# Patient Record
Sex: MALE | Race: OTHER | NOT HISPANIC OR LATINO | URBAN - METROPOLITAN AREA
[De-identification: names, ages, dates, MRNs, and addresses within clinical notes are randomized per-mention and may not be internally consistent; named-entity substitution may affect disease eponyms.]

---

## 2020-07-04 ENCOUNTER — INPATIENT (INPATIENT)
Facility: HOSPITAL | Age: 20
LOS: 3 days | Discharge: HOME | DRG: 639 | End: 2020-07-08
Attending: INTERNAL MEDICINE | Admitting: INTERNAL MEDICINE
Payer: COMMERCIAL

## 2020-07-04 VITALS
OXYGEN SATURATION: 99 % | DIASTOLIC BLOOD PRESSURE: 91 MMHG | TEMPERATURE: 98 F | HEIGHT: 73.5 IN | SYSTOLIC BLOOD PRESSURE: 143 MMHG | RESPIRATION RATE: 20 BRPM | WEIGHT: 277.78 LBS | HEART RATE: 108 BPM

## 2020-07-04 DIAGNOSIS — E11.10 TYPE 2 DIABETES MELLITUS WITH KETOACIDOSIS WITHOUT COMA: ICD-10-CM

## 2020-07-04 LAB
ALBUMIN SERPL ELPH-MCNC: 3.6 G/DL — SIGNIFICANT CHANGE UP (ref 3.5–5)
ALBUMIN SERPL ELPH-MCNC: 4.4 G/DL — SIGNIFICANT CHANGE UP (ref 3.5–5)
ALP SERPL-CCNC: 114 U/L — SIGNIFICANT CHANGE UP (ref 40–120)
ALP SERPL-CCNC: 93 U/L — SIGNIFICANT CHANGE UP (ref 40–120)
ALT FLD-CCNC: 58 U/L DA — SIGNIFICANT CHANGE UP (ref 10–60)
ALT FLD-CCNC: 74 U/L DA — HIGH (ref 10–60)
ANION GAP SERPL CALC-SCNC: 17 MMOL/L — SIGNIFICANT CHANGE UP (ref 5–17)
ANION GAP SERPL CALC-SCNC: 20 MMOL/L — HIGH (ref 5–17)
ANISOCYTOSIS BLD QL: SLIGHT — SIGNIFICANT CHANGE UP
APPEARANCE UR: CLEAR — SIGNIFICANT CHANGE UP
AST SERPL-CCNC: 27 U/L — SIGNIFICANT CHANGE UP (ref 10–40)
AST SERPL-CCNC: 50 U/L — HIGH (ref 10–40)
BACTERIA # UR AUTO: ABNORMAL /HPF
BASE EXCESS BLDV CALC-SCNC: -16.8 MMOL/L — LOW (ref -2–2)
BASE EXCESS BLDV CALC-SCNC: -17.6 MMOL/L — LOW (ref -2–2)
BASOPHILS # BLD AUTO: 0.04 K/UL — SIGNIFICANT CHANGE UP (ref 0–0.2)
BASOPHILS NFR BLD AUTO: 0.6 % — SIGNIFICANT CHANGE UP (ref 0–2)
BILIRUB SERPL-MCNC: 0.5 MG/DL — SIGNIFICANT CHANGE UP (ref 0.2–1.2)
BILIRUB SERPL-MCNC: 0.7 MG/DL — SIGNIFICANT CHANGE UP (ref 0.2–1.2)
BILIRUB UR-MCNC: NEGATIVE — SIGNIFICANT CHANGE UP
BLOOD GAS COMMENTS, VENOUS: SIGNIFICANT CHANGE UP
BLOOD GAS COMMENTS, VENOUS: SIGNIFICANT CHANGE UP
BUN SERPL-MCNC: 10 MG/DL — SIGNIFICANT CHANGE UP (ref 7–18)
BUN SERPL-MCNC: 8 MG/DL — SIGNIFICANT CHANGE UP (ref 7–18)
CALCIUM SERPL-MCNC: 7.1 MG/DL — LOW (ref 8.4–10.5)
CALCIUM SERPL-MCNC: 8.8 MG/DL — SIGNIFICANT CHANGE UP (ref 8.4–10.5)
CHLORIDE SERPL-SCNC: 100 MMOL/L — SIGNIFICANT CHANGE UP (ref 96–108)
CHLORIDE SERPL-SCNC: 108 MMOL/L — SIGNIFICANT CHANGE UP (ref 96–108)
CO2 SERPL-SCNC: 11 MMOL/L — LOW (ref 22–31)
CO2 SERPL-SCNC: 12 MMOL/L — LOW (ref 22–31)
COLOR SPEC: YELLOW — SIGNIFICANT CHANGE UP
CREAT SERPL-MCNC: 0.92 MG/DL — SIGNIFICANT CHANGE UP (ref 0.5–1.3)
CREAT SERPL-MCNC: 1.23 MG/DL — SIGNIFICANT CHANGE UP (ref 0.5–1.3)
DIFF PNL FLD: ABNORMAL
EOSINOPHIL # BLD AUTO: 0.06 K/UL — SIGNIFICANT CHANGE UP (ref 0–0.5)
EOSINOPHIL NFR BLD AUTO: 0.8 % — SIGNIFICANT CHANGE UP (ref 0–6)
EPI CELLS # UR: SIGNIFICANT CHANGE UP /HPF
GLUCOSE SERPL-MCNC: 238 MG/DL — HIGH (ref 70–99)
GLUCOSE SERPL-MCNC: 306 MG/DL — HIGH (ref 70–99)
GLUCOSE UR QL: 1000 MG/DL
GRAN CASTS # UR COMP ASSIST: ABNORMAL /LPF
HCO3 BLDV-SCNC: 11 MMOL/L — LOW (ref 21–29)
HCO3 BLDV-SCNC: 11 MMOL/L — LOW (ref 21–29)
HCT VFR BLD CALC: 49.1 % — SIGNIFICANT CHANGE UP (ref 39–50)
HGB BLD-MCNC: 16.2 G/DL — SIGNIFICANT CHANGE UP (ref 13–17)
HOROWITZ INDEX BLDV+IHG-RTO: 21 — SIGNIFICANT CHANGE UP
HOROWITZ INDEX BLDV+IHG-RTO: 21 — SIGNIFICANT CHANGE UP
HYALINE CASTS # UR AUTO: ABNORMAL /LPF
HYPOCHROMIA BLD QL: SLIGHT — SIGNIFICANT CHANGE UP
IMM GRANULOCYTES NFR BLD AUTO: 0.6 % — SIGNIFICANT CHANGE UP (ref 0–1.5)
KETONES UR-MCNC: ABNORMAL
LEUKOCYTE ESTERASE UR-ACNC: NEGATIVE — SIGNIFICANT CHANGE UP
LG PLATELETS BLD QL AUTO: SLIGHT — SIGNIFICANT CHANGE UP
LIDOCAIN IGE QN: 291 U/L — SIGNIFICANT CHANGE UP (ref 73–393)
LYMPHOCYTES # BLD AUTO: 2.02 K/UL — SIGNIFICANT CHANGE UP (ref 1–3.3)
LYMPHOCYTES # BLD AUTO: 28.6 % — SIGNIFICANT CHANGE UP (ref 13–44)
MANUAL SMEAR VERIFICATION: SIGNIFICANT CHANGE UP
MCHC RBC-ENTMCNC: 22.7 PG — LOW (ref 27–34)
MCHC RBC-ENTMCNC: 33 GM/DL — SIGNIFICANT CHANGE UP (ref 32–36)
MCV RBC AUTO: 68.9 FL — LOW (ref 80–100)
MICROCYTES BLD QL: SLIGHT — SIGNIFICANT CHANGE UP
MONOCYTES # BLD AUTO: 0.55 K/UL — SIGNIFICANT CHANGE UP (ref 0–0.9)
MONOCYTES NFR BLD AUTO: 7.8 % — SIGNIFICANT CHANGE UP (ref 2–14)
NEUTROPHILS # BLD AUTO: 4.36 K/UL — SIGNIFICANT CHANGE UP (ref 1.8–7.4)
NEUTROPHILS NFR BLD AUTO: 61.6 % — SIGNIFICANT CHANGE UP (ref 43–77)
NITRITE UR-MCNC: NEGATIVE — SIGNIFICANT CHANGE UP
NRBC # BLD: 0 /100 WBCS — SIGNIFICANT CHANGE UP (ref 0–0)
PCO2 BLDV: 32 MMHG — LOW (ref 35–50)
PCO2 BLDV: 33 MMHG — LOW (ref 35–50)
PH BLDV: 7.14 — CRITICAL LOW (ref 7.35–7.45)
PH BLDV: 7.16 — CRITICAL LOW (ref 7.35–7.45)
PH UR: 6 — SIGNIFICANT CHANGE UP (ref 5–8)
PLAT MORPH BLD: NORMAL — SIGNIFICANT CHANGE UP
PLATELET # BLD AUTO: 237 K/UL — SIGNIFICANT CHANGE UP (ref 150–400)
PLATELET COUNT - ESTIMATE: NORMAL — SIGNIFICANT CHANGE UP
PO2 BLDV: 25 MMHG — SIGNIFICANT CHANGE UP (ref 25–45)
PO2 BLDV: 28 MMHG — SIGNIFICANT CHANGE UP (ref 25–45)
POTASSIUM SERPL-MCNC: 3.7 MMOL/L — SIGNIFICANT CHANGE UP (ref 3.5–5.3)
POTASSIUM SERPL-MCNC: 4.3 MMOL/L — SIGNIFICANT CHANGE UP (ref 3.5–5.3)
POTASSIUM SERPL-SCNC: 3.7 MMOL/L — SIGNIFICANT CHANGE UP (ref 3.5–5.3)
POTASSIUM SERPL-SCNC: 4.3 MMOL/L — SIGNIFICANT CHANGE UP (ref 3.5–5.3)
PROT SERPL-MCNC: 7.5 G/DL — SIGNIFICANT CHANGE UP (ref 6–8.3)
PROT SERPL-MCNC: 9.5 G/DL — HIGH (ref 6–8.3)
PROT UR-MCNC: 100
RBC # BLD: 7.13 M/UL — HIGH (ref 4.2–5.8)
RBC # FLD: 17.6 % — HIGH (ref 10.3–14.5)
RBC BLD AUTO: ABNORMAL
RBC CASTS # UR COMP ASSIST: ABNORMAL /HPF (ref 0–2)
SAO2 % BLDV: 41 % — LOW (ref 67–88)
SAO2 % BLDV: 50 % — LOW (ref 67–88)
SODIUM SERPL-SCNC: 131 MMOL/L — LOW (ref 135–145)
SODIUM SERPL-SCNC: 137 MMOL/L — SIGNIFICANT CHANGE UP (ref 135–145)
SP GR SPEC: 1.03 — HIGH (ref 1.01–1.02)
UROBILINOGEN FLD QL: NEGATIVE — SIGNIFICANT CHANGE UP
WBC # BLD: 7.07 K/UL — SIGNIFICANT CHANGE UP (ref 3.8–10.5)
WBC # FLD AUTO: 7.07 K/UL — SIGNIFICANT CHANGE UP (ref 3.8–10.5)
WBC UR QL: SIGNIFICANT CHANGE UP /HPF (ref 0–5)

## 2020-07-04 PROCEDURE — 99285 EMERGENCY DEPT VISIT HI MDM: CPT

## 2020-07-04 PROCEDURE — 74177 CT ABD & PELVIS W/CONTRAST: CPT | Mod: 26

## 2020-07-04 RX ORDER — SODIUM CHLORIDE 9 MG/ML
500 INJECTION INTRAMUSCULAR; INTRAVENOUS; SUBCUTANEOUS ONCE
Refills: 0 | Status: COMPLETED | OUTPATIENT
Start: 2020-07-04 | End: 2020-07-04

## 2020-07-04 RX ORDER — INSULIN LISPRO 100/ML
VIAL (ML) SUBCUTANEOUS AT BEDTIME
Refills: 0 | Status: DISCONTINUED | OUTPATIENT
Start: 2020-07-04 | End: 2020-07-05

## 2020-07-04 RX ORDER — ONDANSETRON 8 MG/1
4 TABLET, FILM COATED ORAL ONCE
Refills: 0 | Status: COMPLETED | OUTPATIENT
Start: 2020-07-04 | End: 2020-07-04

## 2020-07-04 RX ORDER — SODIUM CHLORIDE 9 MG/ML
1000 INJECTION, SOLUTION INTRAVENOUS
Refills: 0 | Status: DISCONTINUED | OUTPATIENT
Start: 2020-07-04 | End: 2020-07-05

## 2020-07-04 RX ORDER — SODIUM CHLORIDE 9 MG/ML
1500 INJECTION INTRAMUSCULAR; INTRAVENOUS; SUBCUTANEOUS ONCE
Refills: 0 | Status: COMPLETED | OUTPATIENT
Start: 2020-07-04 | End: 2020-07-04

## 2020-07-04 RX ORDER — SODIUM CHLORIDE 9 MG/ML
1000 INJECTION INTRAMUSCULAR; INTRAVENOUS; SUBCUTANEOUS ONCE
Refills: 0 | Status: COMPLETED | OUTPATIENT
Start: 2020-07-04 | End: 2020-07-04

## 2020-07-04 RX ORDER — INSULIN HUMAN 100 [IU]/ML
5 INJECTION, SOLUTION SUBCUTANEOUS ONCE
Refills: 0 | Status: COMPLETED | OUTPATIENT
Start: 2020-07-04 | End: 2020-07-04

## 2020-07-04 RX ORDER — INSULIN LISPRO 100/ML
VIAL (ML) SUBCUTANEOUS
Refills: 0 | Status: DISCONTINUED | OUTPATIENT
Start: 2020-07-04 | End: 2020-07-05

## 2020-07-04 RX ADMIN — SODIUM CHLORIDE 1000 MILLILITER(S): 9 INJECTION INTRAMUSCULAR; INTRAVENOUS; SUBCUTANEOUS at 19:10

## 2020-07-04 RX ADMIN — SODIUM CHLORIDE 500 MILLILITER(S): 9 INJECTION INTRAMUSCULAR; INTRAVENOUS; SUBCUTANEOUS at 20:10

## 2020-07-04 RX ADMIN — ONDANSETRON 4 MILLIGRAM(S): 8 TABLET, FILM COATED ORAL at 18:00

## 2020-07-04 RX ADMIN — SODIUM CHLORIDE 1000 MILLILITER(S): 9 INJECTION INTRAMUSCULAR; INTRAVENOUS; SUBCUTANEOUS at 18:00

## 2020-07-04 RX ADMIN — SODIUM CHLORIDE 500 MILLILITER(S): 9 INJECTION INTRAMUSCULAR; INTRAVENOUS; SUBCUTANEOUS at 23:54

## 2020-07-04 RX ADMIN — SODIUM CHLORIDE 1500 MILLILITER(S): 9 INJECTION INTRAMUSCULAR; INTRAVENOUS; SUBCUTANEOUS at 07:35

## 2020-07-04 RX ADMIN — INSULIN HUMAN 5 UNIT(S): 100 INJECTION, SOLUTION SUBCUTANEOUS at 22:54

## 2020-07-04 RX ADMIN — SODIUM CHLORIDE 200 MILLILITER(S): 9 INJECTION, SOLUTION INTRAVENOUS at 23:54

## 2020-07-04 NOTE — ED PROVIDER NOTE - PROGRESS NOTE DETAILS
CT shows fatty liver. Initial lab/urine workup consistent with DKA. After 3 liters BG trending down but AG still borderline and acidosis worsening at 7.14. Hemodynamically stable. Will admit for workup. Had no PMD follow up.

## 2020-07-04 NOTE — ED ADULT TRIAGE NOTE - CHIEF COMPLAINT QUOTE
Constipation x 4 days. Pt dx with Hemorrhoids. Seen at urgent care x yesterday. No relief. Also vomiting x yesterday.

## 2020-07-04 NOTE — ED ADULT NURSE NOTE - NSIMPLEMENTINTERV_GEN_ALL_ED
Implemented All Universal Safety Interventions:  [place] to call system. Call bell, personal items and telephone within reach. Instruct patient to call for assistance. Room bathroom lighting operational. Non-slip footwear when patient is off stretcher. Physically safe environment: no spills, clutter or unnecessary equipment. Stretcher in lowest position, wheels locked, appropriate side rails in place.

## 2020-07-04 NOTE — ED PROVIDER NOTE - OBJECTIVE STATEMENT
20 year-old male, history of overweight, no surgical history, presents with cc constipation x 3 days. Gradual onset of inability to have a BM at all for 3 days which is unusual for him. Still able to pass gas. Since yesterday started having R sided lower abdominal discomfort. Also reports nausea and 1-2 episodes of NBNB vomiting after eating every day for 3 days. Denies any fever, chills, urinary symptoms, testicular pain, flank pain or any other complaints. Was seen at urgent care and told that he had hemorrhoids and fissure and was given cream.

## 2020-07-04 NOTE — ED PROVIDER NOTE - ATTENDING CONTRIBUTION TO CARE
19 yo M with abdominal pain and constipation X 3 days. Nonfocal exam. Labs remarkable for mild DKA. AG 20. VBG 7.16. Mildly improved with IVFs. Insulin given. BG improved, but patient still acidotics. Will admit for further diabetic management. patient will new onset diabetes and will need endo/PMD f/u. Agreeable for admission. Endorsed to JONATHAN Curtis.     Dr. MARIO ALBERTO Arauz:  I have independently evaluated the patient and have documented in the appropriate sections above.  I agree with the exam and plan as noted above.

## 2020-07-05 DIAGNOSIS — E03.9 HYPOTHYROIDISM, UNSPECIFIED: ICD-10-CM

## 2020-07-05 DIAGNOSIS — E11.10 TYPE 2 DIABETES MELLITUS WITH KETOACIDOSIS WITHOUT COMA: ICD-10-CM

## 2020-07-05 DIAGNOSIS — E66.9 OBESITY, UNSPECIFIED: ICD-10-CM

## 2020-07-05 DIAGNOSIS — K59.00 CONSTIPATION, UNSPECIFIED: ICD-10-CM

## 2020-07-05 DIAGNOSIS — E11.9 TYPE 2 DIABETES MELLITUS WITHOUT COMPLICATIONS: ICD-10-CM

## 2020-07-05 LAB
A1C WITH ESTIMATED AVERAGE GLUCOSE RESULT: 11.4 % — HIGH (ref 4–5.6)
A1C WITH ESTIMATED AVERAGE GLUCOSE RESULT: 11.5 % — HIGH (ref 4–5.6)
ALBUMIN SERPL ELPH-MCNC: 3.2 G/DL — LOW (ref 3.5–5)
ALBUMIN SERPL ELPH-MCNC: 3.4 G/DL — LOW (ref 3.5–5)
ALBUMIN SERPL ELPH-MCNC: 3.6 G/DL — SIGNIFICANT CHANGE UP (ref 3.5–5)
ALP SERPL-CCNC: 78 U/L — SIGNIFICANT CHANGE UP (ref 40–120)
ALP SERPL-CCNC: 80 U/L — SIGNIFICANT CHANGE UP (ref 40–120)
ALP SERPL-CCNC: 90 U/L — SIGNIFICANT CHANGE UP (ref 40–120)
ALT FLD-CCNC: 54 U/L DA — SIGNIFICANT CHANGE UP (ref 10–60)
ALT FLD-CCNC: 55 U/L DA — SIGNIFICANT CHANGE UP (ref 10–60)
ALT FLD-CCNC: 56 U/L DA — SIGNIFICANT CHANGE UP (ref 10–60)
ANION GAP SERPL CALC-SCNC: 14 MMOL/L — SIGNIFICANT CHANGE UP (ref 5–17)
ANION GAP SERPL CALC-SCNC: 14 MMOL/L — SIGNIFICANT CHANGE UP (ref 5–17)
ANION GAP SERPL CALC-SCNC: 15 MMOL/L — SIGNIFICANT CHANGE UP (ref 5–17)
ANION GAP SERPL CALC-SCNC: 15 MMOL/L — SIGNIFICANT CHANGE UP (ref 5–17)
ANION GAP SERPL CALC-SCNC: 16 MMOL/L — SIGNIFICANT CHANGE UP (ref 5–17)
ANION GAP SERPL CALC-SCNC: 17 MMOL/L — SIGNIFICANT CHANGE UP (ref 5–17)
AST SERPL-CCNC: 21 U/L — SIGNIFICANT CHANGE UP (ref 10–40)
AST SERPL-CCNC: 24 U/L — SIGNIFICANT CHANGE UP (ref 10–40)
AST SERPL-CCNC: 30 U/L — SIGNIFICANT CHANGE UP (ref 10–40)
BASE EXCESS BLDA CALC-SCNC: -17.8 MMOL/L — LOW (ref -2–2)
BASE EXCESS BLDV CALC-SCNC: -16.2 MMOL/L — LOW (ref -2–2)
BASE EXCESS BLDV CALC-SCNC: -16.5 MMOL/L — LOW (ref -2–2)
BASOPHILS # BLD AUTO: 0.04 K/UL — SIGNIFICANT CHANGE UP (ref 0–0.2)
BASOPHILS NFR BLD AUTO: 0.5 % — SIGNIFICANT CHANGE UP (ref 0–2)
BILIRUB SERPL-MCNC: 0.5 MG/DL — SIGNIFICANT CHANGE UP (ref 0.2–1.2)
BILIRUB SERPL-MCNC: 0.5 MG/DL — SIGNIFICANT CHANGE UP (ref 0.2–1.2)
BILIRUB SERPL-MCNC: 0.6 MG/DL — SIGNIFICANT CHANGE UP (ref 0.2–1.2)
BLOOD GAS COMMENTS ARTERIAL: SIGNIFICANT CHANGE UP
BLOOD GAS COMMENTS, VENOUS: SIGNIFICANT CHANGE UP
BUN SERPL-MCNC: 4 MG/DL — LOW (ref 7–18)
BUN SERPL-MCNC: 5 MG/DL — LOW (ref 7–18)
BUN SERPL-MCNC: 6 MG/DL — LOW (ref 7–18)
BUN SERPL-MCNC: 6 MG/DL — LOW (ref 7–18)
BUN SERPL-MCNC: 7 MG/DL — SIGNIFICANT CHANGE UP (ref 7–18)
BUN SERPL-MCNC: 8 MG/DL — SIGNIFICANT CHANGE UP (ref 7–18)
CALCIUM SERPL-MCNC: 7.6 MG/DL — LOW (ref 8.4–10.5)
CALCIUM SERPL-MCNC: 7.6 MG/DL — LOW (ref 8.4–10.5)
CALCIUM SERPL-MCNC: 7.7 MG/DL — LOW (ref 8.4–10.5)
CALCIUM SERPL-MCNC: 7.7 MG/DL — LOW (ref 8.4–10.5)
CALCIUM SERPL-MCNC: 7.8 MG/DL — LOW (ref 8.4–10.5)
CALCIUM SERPL-MCNC: 7.9 MG/DL — LOW (ref 8.4–10.5)
CHLORIDE SERPL-SCNC: 108 MMOL/L — SIGNIFICANT CHANGE UP (ref 96–108)
CHLORIDE SERPL-SCNC: 108 MMOL/L — SIGNIFICANT CHANGE UP (ref 96–108)
CHLORIDE SERPL-SCNC: 111 MMOL/L — HIGH (ref 96–108)
CHLORIDE SERPL-SCNC: 111 MMOL/L — HIGH (ref 96–108)
CHLORIDE SERPL-SCNC: 112 MMOL/L — HIGH (ref 96–108)
CHLORIDE SERPL-SCNC: 113 MMOL/L — HIGH (ref 96–108)
CHOLEST SERPL-MCNC: 217 MG/DL — HIGH (ref 10–199)
CO2 SERPL-SCNC: 11 MMOL/L — LOW (ref 22–31)
CO2 SERPL-SCNC: 12 MMOL/L — LOW (ref 22–31)
CO2 SERPL-SCNC: 13 MMOL/L — LOW (ref 22–31)
CO2 SERPL-SCNC: 14 MMOL/L — LOW (ref 22–31)
CREAT SERPL-MCNC: 0.86 MG/DL — SIGNIFICANT CHANGE UP (ref 0.5–1.3)
CREAT SERPL-MCNC: 0.9 MG/DL — SIGNIFICANT CHANGE UP (ref 0.5–1.3)
CREAT SERPL-MCNC: 0.92 MG/DL — SIGNIFICANT CHANGE UP (ref 0.5–1.3)
CREAT SERPL-MCNC: 0.92 MG/DL — SIGNIFICANT CHANGE UP (ref 0.5–1.3)
CREAT SERPL-MCNC: 0.96 MG/DL — SIGNIFICANT CHANGE UP (ref 0.5–1.3)
CREAT SERPL-MCNC: 0.97 MG/DL — SIGNIFICANT CHANGE UP (ref 0.5–1.3)
EOSINOPHIL # BLD AUTO: 0.06 K/UL — SIGNIFICANT CHANGE UP (ref 0–0.5)
EOSINOPHIL NFR BLD AUTO: 0.8 % — SIGNIFICANT CHANGE UP (ref 0–6)
ESTIMATED AVERAGE GLUCOSE: 280 MG/DL — HIGH (ref 68–114)
ESTIMATED AVERAGE GLUCOSE: 283 MG/DL — HIGH (ref 68–114)
FOLATE SERPL-MCNC: >20 NG/ML — SIGNIFICANT CHANGE UP
GLUCOSE BLDC GLUCOMTR-MCNC: 133 MG/DL — HIGH (ref 70–99)
GLUCOSE BLDC GLUCOMTR-MCNC: 150 MG/DL — HIGH (ref 70–99)
GLUCOSE BLDC GLUCOMTR-MCNC: 171 MG/DL — HIGH (ref 70–99)
GLUCOSE BLDC GLUCOMTR-MCNC: 174 MG/DL — HIGH (ref 70–99)
GLUCOSE BLDC GLUCOMTR-MCNC: 183 MG/DL — HIGH (ref 70–99)
GLUCOSE BLDC GLUCOMTR-MCNC: 208 MG/DL — HIGH (ref 70–99)
GLUCOSE BLDC GLUCOMTR-MCNC: 222 MG/DL — HIGH (ref 70–99)
GLUCOSE BLDC GLUCOMTR-MCNC: 226 MG/DL — HIGH (ref 70–99)
GLUCOSE BLDC GLUCOMTR-MCNC: 226 MG/DL — HIGH (ref 70–99)
GLUCOSE BLDC GLUCOMTR-MCNC: 237 MG/DL — HIGH (ref 70–99)
GLUCOSE BLDC GLUCOMTR-MCNC: 257 MG/DL — HIGH (ref 70–99)
GLUCOSE BLDC GLUCOMTR-MCNC: 267 MG/DL — HIGH (ref 70–99)
GLUCOSE BLDC GLUCOMTR-MCNC: 288 MG/DL — HIGH (ref 70–99)
GLUCOSE SERPL-MCNC: 157 MG/DL — HIGH (ref 70–99)
GLUCOSE SERPL-MCNC: 200 MG/DL — HIGH (ref 70–99)
GLUCOSE SERPL-MCNC: 205 MG/DL — HIGH (ref 70–99)
GLUCOSE SERPL-MCNC: 222 MG/DL — HIGH (ref 70–99)
GLUCOSE SERPL-MCNC: 234 MG/DL — HIGH (ref 70–99)
GLUCOSE SERPL-MCNC: 250 MG/DL — HIGH (ref 70–99)
HCO3 BLDA-SCNC: 8 MMOL/L — LOW (ref 23–27)
HCO3 BLDV-SCNC: 10 MMOL/L — LOW (ref 21–29)
HCO3 BLDV-SCNC: 10 MMOL/L — LOW (ref 21–29)
HCT VFR BLD CALC: 43.1 % — SIGNIFICANT CHANGE UP (ref 39–50)
HCT VFR BLD CALC: 45.6 % — SIGNIFICANT CHANGE UP (ref 39–50)
HDLC SERPL-MCNC: 34 MG/DL — LOW
HGB BLD-MCNC: 13.8 G/DL — SIGNIFICANT CHANGE UP (ref 13–17)
HGB BLD-MCNC: 14.7 G/DL — SIGNIFICANT CHANGE UP (ref 13–17)
HOROWITZ INDEX BLDA+IHG-RTO: 21 — SIGNIFICANT CHANGE UP
HOROWITZ INDEX BLDV+IHG-RTO: 21 — SIGNIFICANT CHANGE UP
HOROWITZ INDEX BLDV+IHG-RTO: 21 — SIGNIFICANT CHANGE UP
IMM GRANULOCYTES NFR BLD AUTO: 0.8 % — SIGNIFICANT CHANGE UP (ref 0–1.5)
LACTATE SERPL-SCNC: 0.8 MMOL/L — SIGNIFICANT CHANGE UP (ref 0.7–2)
LIPID PNL WITH DIRECT LDL SERPL: 137 MG/DL — SIGNIFICANT CHANGE UP
LYMPHOCYTES # BLD AUTO: 1.72 K/UL — SIGNIFICANT CHANGE UP (ref 1–3.3)
LYMPHOCYTES # BLD AUTO: 22.3 % — SIGNIFICANT CHANGE UP (ref 13–44)
MAGNESIUM SERPL-MCNC: 2 MG/DL — SIGNIFICANT CHANGE UP (ref 1.6–2.6)
MAGNESIUM SERPL-MCNC: 2 MG/DL — SIGNIFICANT CHANGE UP (ref 1.6–2.6)
MAGNESIUM SERPL-MCNC: 2.1 MG/DL — SIGNIFICANT CHANGE UP (ref 1.6–2.6)
MAGNESIUM SERPL-MCNC: 2.1 MG/DL — SIGNIFICANT CHANGE UP (ref 1.6–2.6)
MCHC RBC-ENTMCNC: 22.3 PG — LOW (ref 27–34)
MCHC RBC-ENTMCNC: 22.6 PG — LOW (ref 27–34)
MCHC RBC-ENTMCNC: 32 GM/DL — SIGNIFICANT CHANGE UP (ref 32–36)
MCHC RBC-ENTMCNC: 32.2 GM/DL — SIGNIFICANT CHANGE UP (ref 32–36)
MCV RBC AUTO: 69.3 FL — LOW (ref 80–100)
MCV RBC AUTO: 70.5 FL — LOW (ref 80–100)
MONOCYTES # BLD AUTO: 0.65 K/UL — SIGNIFICANT CHANGE UP (ref 0–0.9)
MONOCYTES NFR BLD AUTO: 8.4 % — SIGNIFICANT CHANGE UP (ref 2–14)
NEUTROPHILS # BLD AUTO: 5.18 K/UL — SIGNIFICANT CHANGE UP (ref 1.8–7.4)
NEUTROPHILS NFR BLD AUTO: 67.2 % — SIGNIFICANT CHANGE UP (ref 43–77)
NRBC # BLD: 0 /100 WBCS — SIGNIFICANT CHANGE UP (ref 0–0)
NRBC # BLD: 0 /100 WBCS — SIGNIFICANT CHANGE UP (ref 0–0)
OSMOLALITY SERPL: 297 MOSMOL/KG — SIGNIFICANT CHANGE UP (ref 275–300)
PCO2 BLDA: 22 MMHG — LOW (ref 32–46)
PCO2 BLDV: 26 MMHG — LOW (ref 35–50)
PCO2 BLDV: 28 MMHG — LOW (ref 35–50)
PCP SPEC-MCNC: SIGNIFICANT CHANGE UP
PH BLDA: 7.22 — LOW (ref 7.35–7.45)
PH BLDV: 7.19 — CRITICAL LOW (ref 7.35–7.45)
PH BLDV: 7.22 — LOW (ref 7.35–7.45)
PHOSPHATE SERPL-MCNC: 0.9 MG/DL — CRITICAL LOW (ref 2.5–4.5)
PHOSPHATE SERPL-MCNC: 1.1 MG/DL — LOW (ref 2.5–4.5)
PHOSPHATE SERPL-MCNC: 1.4 MG/DL — LOW (ref 2.5–4.5)
PHOSPHATE SERPL-MCNC: 1.5 MG/DL — LOW (ref 2.5–4.5)
PLATELET # BLD AUTO: 220 K/UL — SIGNIFICANT CHANGE UP (ref 150–400)
PLATELET # BLD AUTO: 227 K/UL — SIGNIFICANT CHANGE UP (ref 150–400)
PO2 BLDA: 122 MMHG — HIGH (ref 74–108)
PO2 BLDV: 30 MMHG — SIGNIFICANT CHANGE UP (ref 25–45)
PO2 BLDV: 38 MMHG — SIGNIFICANT CHANGE UP (ref 25–45)
POTASSIUM SERPL-MCNC: 3.1 MMOL/L — LOW (ref 3.5–5.3)
POTASSIUM SERPL-MCNC: 3.5 MMOL/L — SIGNIFICANT CHANGE UP (ref 3.5–5.3)
POTASSIUM SERPL-MCNC: 3.5 MMOL/L — SIGNIFICANT CHANGE UP (ref 3.5–5.3)
POTASSIUM SERPL-MCNC: 3.6 MMOL/L — SIGNIFICANT CHANGE UP (ref 3.5–5.3)
POTASSIUM SERPL-MCNC: 3.7 MMOL/L — SIGNIFICANT CHANGE UP (ref 3.5–5.3)
POTASSIUM SERPL-MCNC: 3.7 MMOL/L — SIGNIFICANT CHANGE UP (ref 3.5–5.3)
POTASSIUM SERPL-SCNC: 3.1 MMOL/L — LOW (ref 3.5–5.3)
POTASSIUM SERPL-SCNC: 3.5 MMOL/L — SIGNIFICANT CHANGE UP (ref 3.5–5.3)
POTASSIUM SERPL-SCNC: 3.5 MMOL/L — SIGNIFICANT CHANGE UP (ref 3.5–5.3)
POTASSIUM SERPL-SCNC: 3.6 MMOL/L — SIGNIFICANT CHANGE UP (ref 3.5–5.3)
POTASSIUM SERPL-SCNC: 3.7 MMOL/L — SIGNIFICANT CHANGE UP (ref 3.5–5.3)
POTASSIUM SERPL-SCNC: 3.7 MMOL/L — SIGNIFICANT CHANGE UP (ref 3.5–5.3)
PROT SERPL-MCNC: 6.8 G/DL — SIGNIFICANT CHANGE UP (ref 6–8.3)
PROT SERPL-MCNC: 7 G/DL — SIGNIFICANT CHANGE UP (ref 6–8.3)
PROT SERPL-MCNC: 7.7 G/DL — SIGNIFICANT CHANGE UP (ref 6–8.3)
RBC # BLD: 6.11 M/UL — HIGH (ref 4.2–5.8)
RBC # BLD: 6.58 M/UL — HIGH (ref 4.2–5.8)
RBC # FLD: 16 % — HIGH (ref 10.3–14.5)
RBC # FLD: 17.2 % — HIGH (ref 10.3–14.5)
SAO2 % BLDA: 98 % — HIGH (ref 92–96)
SAO2 % BLDV: 65 % — LOW (ref 67–88)
SAO2 % BLDV: 78 % — SIGNIFICANT CHANGE UP (ref 67–88)
SARS-COV-2 IGG SERPL QL IA: POSITIVE
SARS-COV-2 IGM SERPL IA-ACNC: 89.4 INDEX — HIGH
SARS-COV-2 RNA SPEC QL NAA+PROBE: SIGNIFICANT CHANGE UP
SARS-COV-2 RNA SPEC QL NAA+PROBE: SIGNIFICANT CHANGE UP
SODIUM SERPL-SCNC: 135 MMOL/L — SIGNIFICANT CHANGE UP (ref 135–145)
SODIUM SERPL-SCNC: 137 MMOL/L — SIGNIFICANT CHANGE UP (ref 135–145)
SODIUM SERPL-SCNC: 137 MMOL/L — SIGNIFICANT CHANGE UP (ref 135–145)
SODIUM SERPL-SCNC: 138 MMOL/L — SIGNIFICANT CHANGE UP (ref 135–145)
SODIUM SERPL-SCNC: 139 MMOL/L — SIGNIFICANT CHANGE UP (ref 135–145)
SODIUM SERPL-SCNC: 140 MMOL/L — SIGNIFICANT CHANGE UP (ref 135–145)
T3FREE SERPL-MCNC: 1.56 PG/ML — LOW (ref 1.8–4.6)
T4 FREE SERPL-MCNC: 0.9 NG/DL — SIGNIFICANT CHANGE UP (ref 0.9–1.8)
TOTAL CHOLESTEROL/HDL RATIO MEASUREMENT: 6.4 RATIO — SIGNIFICANT CHANGE UP (ref 3.4–9.6)
TRIGL SERPL-MCNC: 229 MG/DL — HIGH (ref 10–149)
TSH SERPL-MCNC: 8.48 UU/ML — HIGH (ref 0.34–4.82)
TSH SERPL-MCNC: 8.58 UU/ML — HIGH (ref 0.34–4.82)
VIT B12 SERPL-MCNC: 900 PG/ML — SIGNIFICANT CHANGE UP (ref 232–1245)
WBC # BLD: 7.71 K/UL — SIGNIFICANT CHANGE UP (ref 3.8–10.5)
WBC # BLD: 8.27 K/UL — SIGNIFICANT CHANGE UP (ref 3.8–10.5)
WBC # FLD AUTO: 7.71 K/UL — SIGNIFICANT CHANGE UP (ref 3.8–10.5)
WBC # FLD AUTO: 8.27 K/UL — SIGNIFICANT CHANGE UP (ref 3.8–10.5)

## 2020-07-05 RX ORDER — INSULIN HUMAN 100 [IU]/ML
1 INJECTION, SOLUTION SUBCUTANEOUS
Qty: 100 | Refills: 0 | Status: DISCONTINUED | OUTPATIENT
Start: 2020-07-05 | End: 2020-07-05

## 2020-07-05 RX ORDER — POLYETHYLENE GLYCOL 3350 17 G/17G
17 POWDER, FOR SOLUTION ORAL ONCE
Refills: 0 | Status: COMPLETED | OUTPATIENT
Start: 2020-07-05 | End: 2020-07-05

## 2020-07-05 RX ORDER — ATORVASTATIN CALCIUM 80 MG/1
40 TABLET, FILM COATED ORAL AT BEDTIME
Refills: 0 | Status: DISCONTINUED | OUTPATIENT
Start: 2020-07-05 | End: 2020-07-08

## 2020-07-05 RX ORDER — INSULIN LISPRO 100/ML
VIAL (ML) SUBCUTANEOUS EVERY 4 HOURS
Refills: 0 | Status: DISCONTINUED | OUTPATIENT
Start: 2020-07-05 | End: 2020-07-05

## 2020-07-05 RX ORDER — LEVOTHYROXINE SODIUM 125 MCG
25 TABLET ORAL DAILY
Refills: 0 | Status: DISCONTINUED | OUTPATIENT
Start: 2020-07-05 | End: 2020-07-06

## 2020-07-05 RX ORDER — POTASSIUM CHLORIDE 20 MEQ
40 PACKET (EA) ORAL ONCE
Refills: 0 | Status: COMPLETED | OUTPATIENT
Start: 2020-07-05 | End: 2020-07-05

## 2020-07-05 RX ORDER — SODIUM CHLORIDE 9 MG/ML
1000 INJECTION, SOLUTION INTRAVENOUS
Refills: 0 | Status: DISCONTINUED | OUTPATIENT
Start: 2020-07-05 | End: 2020-07-05

## 2020-07-05 RX ORDER — INSULIN HUMAN 100 [IU]/ML
2 INJECTION, SOLUTION SUBCUTANEOUS
Qty: 100 | Refills: 0 | Status: DISCONTINUED | OUTPATIENT
Start: 2020-07-05 | End: 2020-07-06

## 2020-07-05 RX ORDER — ENOXAPARIN SODIUM 100 MG/ML
40 INJECTION SUBCUTANEOUS DAILY
Refills: 0 | Status: DISCONTINUED | OUTPATIENT
Start: 2020-07-05 | End: 2020-07-08

## 2020-07-05 RX ORDER — INSULIN LISPRO 100/ML
VIAL (ML) SUBCUTANEOUS EVERY 6 HOURS
Refills: 0 | Status: DISCONTINUED | OUTPATIENT
Start: 2020-07-05 | End: 2020-07-05

## 2020-07-05 RX ORDER — PANTOPRAZOLE SODIUM 20 MG/1
40 TABLET, DELAYED RELEASE ORAL DAILY
Refills: 0 | Status: DISCONTINUED | OUTPATIENT
Start: 2020-07-05 | End: 2020-07-08

## 2020-07-05 RX ORDER — POTASSIUM PHOSPHATE, MONOBASIC POTASSIUM PHOSPHATE, DIBASIC 236; 224 MG/ML; MG/ML
30 INJECTION, SOLUTION INTRAVENOUS ONCE
Refills: 0 | Status: COMPLETED | OUTPATIENT
Start: 2020-07-05 | End: 2020-07-05

## 2020-07-05 RX ORDER — DEXTROSE MONOHYDRATE, SODIUM CHLORIDE, AND POTASSIUM CHLORIDE 50; .745; 4.5 G/1000ML; G/1000ML; G/1000ML
1000 INJECTION, SOLUTION INTRAVENOUS
Refills: 0 | Status: DISCONTINUED | OUTPATIENT
Start: 2020-07-05 | End: 2020-07-06

## 2020-07-05 RX ORDER — SODIUM CHLORIDE 9 MG/ML
1000 INJECTION INTRAMUSCULAR; INTRAVENOUS; SUBCUTANEOUS ONCE
Refills: 0 | Status: COMPLETED | OUTPATIENT
Start: 2020-07-05 | End: 2020-07-05

## 2020-07-05 RX ORDER — INSULIN HUMAN 100 [IU]/ML
12 INJECTION, SOLUTION SUBCUTANEOUS
Qty: 100 | Refills: 0 | Status: DISCONTINUED | OUTPATIENT
Start: 2020-07-05 | End: 2020-07-05

## 2020-07-05 RX ORDER — POTASSIUM PHOSPHATE, MONOBASIC POTASSIUM PHOSPHATE, DIBASIC 236; 224 MG/ML; MG/ML
15 INJECTION, SOLUTION INTRAVENOUS ONCE
Refills: 0 | Status: COMPLETED | OUTPATIENT
Start: 2020-07-05 | End: 2020-07-05

## 2020-07-05 RX ORDER — POTASSIUM PHOSPHATE, MONOBASIC POTASSIUM PHOSPHATE, DIBASIC 236; 224 MG/ML; MG/ML
15 INJECTION, SOLUTION INTRAVENOUS ONCE
Refills: 0 | Status: DISCONTINUED | OUTPATIENT
Start: 2020-07-05 | End: 2020-07-05

## 2020-07-05 RX ADMIN — POTASSIUM PHOSPHATE, MONOBASIC POTASSIUM PHOSPHATE, DIBASIC 83.33 MILLIMOLE(S): 236; 224 INJECTION, SOLUTION INTRAVENOUS at 16:08

## 2020-07-05 RX ADMIN — POTASSIUM PHOSPHATE, MONOBASIC POTASSIUM PHOSPHATE, DIBASIC 62.5 MILLIMOLE(S): 236; 224 INJECTION, SOLUTION INTRAVENOUS at 09:37

## 2020-07-05 RX ADMIN — Medication 40 MILLIEQUIVALENT(S): at 15:36

## 2020-07-05 RX ADMIN — DEXTROSE MONOHYDRATE, SODIUM CHLORIDE, AND POTASSIUM CHLORIDE 200 MILLILITER(S): 50; .745; 4.5 INJECTION, SOLUTION INTRAVENOUS at 18:39

## 2020-07-05 RX ADMIN — SODIUM CHLORIDE 200 MILLILITER(S): 9 INJECTION, SOLUTION INTRAVENOUS at 05:44

## 2020-07-05 RX ADMIN — DEXTROSE MONOHYDRATE, SODIUM CHLORIDE, AND POTASSIUM CHLORIDE 200 MILLILITER(S): 50; .745; 4.5 INJECTION, SOLUTION INTRAVENOUS at 16:30

## 2020-07-05 RX ADMIN — INSULIN HUMAN 2 UNIT(S)/HR: 100 INJECTION, SOLUTION SUBCUTANEOUS at 20:08

## 2020-07-05 RX ADMIN — PANTOPRAZOLE SODIUM 40 MILLIGRAM(S): 20 TABLET, DELAYED RELEASE ORAL at 13:08

## 2020-07-05 RX ADMIN — ENOXAPARIN SODIUM 40 MILLIGRAM(S): 100 INJECTION SUBCUTANEOUS at 13:57

## 2020-07-05 RX ADMIN — Medication 25 MICROGRAM(S): at 13:52

## 2020-07-05 RX ADMIN — SODIUM CHLORIDE 150 MILLILITER(S): 9 INJECTION, SOLUTION INTRAVENOUS at 12:49

## 2020-07-05 RX ADMIN — INSULIN HUMAN 12 UNIT(S)/HR: 100 INJECTION, SOLUTION SUBCUTANEOUS at 12:04

## 2020-07-05 RX ADMIN — ATORVASTATIN CALCIUM 40 MILLIGRAM(S): 80 TABLET, FILM COATED ORAL at 21:01

## 2020-07-05 RX ADMIN — POTASSIUM PHOSPHATE, MONOBASIC POTASSIUM PHOSPHATE, DIBASIC 83.33 MILLIMOLE(S): 236; 224 INJECTION, SOLUTION INTRAVENOUS at 21:01

## 2020-07-05 RX ADMIN — SODIUM CHLORIDE 1000 MILLILITER(S): 9 INJECTION INTRAMUSCULAR; INTRAVENOUS; SUBCUTANEOUS at 09:38

## 2020-07-05 RX ADMIN — Medication 4: at 05:43

## 2020-07-05 RX ADMIN — POLYETHYLENE GLYCOL 3350 17 GRAM(S): 17 POWDER, FOR SOLUTION ORAL at 02:54

## 2020-07-05 NOTE — CONSULT NOTE ADULT - ASSESSMENT
20 year-old  overweight male, with medical history of hypothyroidism not on medication, presents with cc constipation x 4 days and vomiting for past 2 days, NBNB.  Patient was admitted to medicine initially but after enough fluid resuscitation acidosis did not resolve. Patient is being transferred to ICU for insulin drip and more close monitoring of BMP.    Diagnosis  1. DKA  2. Metabolic acidosis  3.Undiagnosed diabetes  4. Hypothyroidism    Plan:     Neuro:   AOx3  No issues  Not on any sedatives    Cardio:  No known cardiac history  EKG- NSR  Not in shock  s/p 4.5 L fluid resuscitation  c/w IVF    Respiratory:  No active issues, saturating well on room air    Infectious:  Normal WBC  No signs of infection    Nephro:  Metabolic acidosis, Anion gap closed  bicarb-11, sr cr-wnl  Potassium-3.6  Phosporous-1.1, replaced  c/w d5+half NS@150cc/hr  BMP q4. monitor electrolytes    Endocrine:  -New onset diabetes which was undiagnosed.  comes in with DKA, anion gap resolved after 4L of fluid resuscitation  patient is still acidotic with Low bicarb-11  ABG showed pH of 7.22  Started on insulin dripn, finger stick q1, BMP q4  Fluids-D5+half NS with potassium supplement@150cc/hr  NPO for now  Monitor pH  [id number]-86.8  Endo consult Dr MALOU Curtis    -Hypothyroidism:  has h/o hypothyroidism, not on treatment   TSh of 8.9  f/u T3, T4    GI:  No active issue 20 year-old  overweight male, with medical history of hypothyroidism not on medication, presents with cc constipation x 4 days and vomiting for past 2 days, NBNB.  Patient was admitted to medicine initially but after enough fluid resuscitation acidosis did not resolve. Patient is being transferred to ICU for insulin drip and more close monitoring of BMP.    Diagnosis  1. DKA  2. Metabolic acidosis  3.Undiagnosed diabetes  4. Hypothyroidism    Plan:     Neuro:   AOx3  No issues  Not on any sedatives    Cardio:  No known cardiac history  EKG- NSR  Not in shock  s/p 4.5 L fluid resuscitation  c/w IVF    Respiratory:  No active issues, saturating well on room air    Infectious:  Normal WBC  No signs of infection    Nephro:  Metabolic acidosis, Anion gap closed  bicarb-11, sr cr-wnl  Potassium-3.6  Phosporous-1.1, replaced  c/w d5+half NS@150cc/hr  BMP q4. monitor electrolytes    Endocrine:  -New onset diabetes which was undiagnosed.  comes in with DKA, anion gap resolved after 4L of fluid resuscitation  patient is still acidotic with Low bicarb-11  ABG showed pH of 7.22  Started on insulin dripn, finger stick q1, BMP q4  Fluids-D5+half NS with potassium supplement@150cc/hr  NPO for now  Monitor pH  [id number]-62.8  Endo consult Dr MALOU Curtis    -Hypothyroidism:  has h/o hypothyroidism, not on treatment   TSh of 8.9  f/u T3, T4    GI:  No active issue    Prophylaxis:  On lovenox sub q  protonix IV

## 2020-07-05 NOTE — H&P ADULT - NSHPPHYSICALEXAM_GEN_ALL_CORE
· CONSTITUTIONAL: Obese appearing, awake, alert, oriented to person, place, time/situation and in no apparent distress.  · EYES: Clear bilaterally, pupils equal, round and reactive to light.  · CARDIAC: Normal rate, regular rhythm.  Heart sounds S1, S2.  · RESPIRATORY: Breath sounds clear and equal bilaterally.  · GASTROINTESTINAL:  soft, nondistended  Abdominal Tenderness Location: right lower quadrant,  Bowel Sounds: present x 4 quadrants  · NEUROLOGICAL: Alert and oriented, no focal deficits, no motor or sensory deficits.  · SKIN: Skin normal color for race, warm, dry and intact. No evidence of rash.

## 2020-07-05 NOTE — H&P ADULT - ASSESSMENT
20 year-old  overweight male, with medical history of hypothyroidism not on medication, presents with cc constipation x 4 days.    ED course:   Was found to have DKA, after 3 L bolus BS came down, with closure of anion gap.  Ct abdomen showed fatty liver and hepatosis    He is being admitted for evaluation of new onset diabetes.

## 2020-07-05 NOTE — H&P ADULT - PROBLEM SELECTOR PLAN 2
Has h/o hypothyroidism, also has FH   has not been taking medication for past couple years, non compliant  Given constipation and overweight, it is likely that his hypothyroidism is not under control  Will f/u TSH   Endo consult f/u

## 2020-07-05 NOTE — CONSULT NOTE ADULT - SUBJECTIVE AND OBJECTIVE BOX
Patient is a 20y old  Male who presents with a chief complaint of Constipation (2020 09:12)      HPI:  21 yo male with medical history significant for hypothyroidism(not on medication) comes in with constipation for past 4 days. He hasn't had a BM for past 4 days and was seen in UC yesterday. He was diagnosed with hemorrhoids and anal fissure, was prescribed lidocaine cream and advised for sitz bath. Also reports nausea and 1-2 episodes of NBNB vomiting after eating every day for 3 days. In ED he was found to have elevated blood sugars with anion gap and ketones in urine. He does not have history of diabetes. No history of abdominal pain, fever, headache, chest pain, urinary symptoms, increased thirst, increased frequency of urination. No h/o recent illness, trauma, sick contacts. (2020 01:27)      Allergies    No Known Allergies    Intolerances        MEDICATIONS  (STANDING):  dextrose 5% + sodium chloride 0.45% 1000 milliLiter(s) (150 mL/Hr) IV Continuous <Continuous>  insulin regular Infusion 12 Unit(s)/Hr (12 mL/Hr) IV Continuous <Continuous>  levothyroxine 25 MICROGram(s) Oral daily    MEDICATIONS  (PRN):  bisacodyl 5 milliGRAM(s) Oral every 12 hours PRN Constipation      Daily Height in cm: 186.69 (2020 17:12)    Daily     Drug Dosing Weight  Height (cm): 186.69 (2020 17:12)  Weight (kg): 126 (2020 17:12)  BMI (kg/m2): 36.2 (2020 17:12)  BSA (m2): 2.49 (2020 17:12)    PAST MEDICAL & SURGICAL HISTORY:  Hypothyroid      FAMILY HISTORY:  FH: hypothyroidism      SOCIAL HISTORY: Smokes [employer]    ADVANCE DIRECTIVES: Full code    REVIEW OF SYSTEMS:    CONSTITUTIONAL: No fever, weight loss, or fatigue  EYES: No eye pain, visual disturbances, or discharge  ENMT:  No difficulty hearing, tinnitus, vertigo; No sinus or throat pain  NECK: No pain or stiffness  BREASTS: No pain, masses, or nipple discharge  RESPIRATORY: No cough, wheezing, chills or hemoptysis; No shortness of breath  CARDIOVASCULAR: No chest pain, palpitations, dizziness, or leg swelling  GASTROINTESTINAL: constipation, vomiting after eating food  GENITOURINARY: No dysuria, frequency, hematuria, or incontinence  NEUROLOGICAL: No headaches, memory loss, loss of strength, numbness, or tremors  SKIN: No itching, burning, rashes, or lesions   LYMPH NODES: No enlarged glands  ENDOCRINE: No heat or cold intolerance; No hair loss  MUSCULOSKELETAL: No joint pain or swelling; No muscle, back, or extremity pain  PSYCHIATRIC: No depression, anxiety, mood swings, or difficulty sleeping  HEME/LYMPH: No easy bruising, or bleeding gums  ALLERGY AND IMMUNOLOGIC: No hives or eczema          ICU Vital Signs Last 24 Hrs  T(C): 36.6 (2020 07:23), Max: 37.2 (2020 21:50)  T(F): 97.9 (2020 07:23), Max: 99 (2020 21:50)  HR: 100 (2020 07:23) (87 - 108)  BP: 133/81 (2020 07:23) (133/81 - 170/99)  RR: 17 (2020 07:23) (17 - 20)  SpO2: 100% (2020 07:23) (98% - 100%)      ABG - ( 2020 08:46 )  pH, Arterial: 7.22  pH, Blood: x     /  pCO2: 22    /  pO2: 122   / HCO3: 8     / Base Excess: -17.8 /  SaO2: 98        I&O's Detail      PHYSICAL EXAM:   CONSTITUTIONAL: Obese appearing, awake, alert, oriented to person, place, time/situation and in no apparent distress.  · EYES: Clear bilaterally, pupils equal, round and reactive to light.  · CARDIAC: Normal rate, regular rhythm.  Heart sounds S1, S2.  · RESPIRATORY: Breath sounds clear and equal bilaterally.  · GASTROINTESTINAL:  soft, nondistended  Abdominal Tenderness Location: right lower quadrant,  Bowel Sounds: present x 4 quadrants  · NEUROLOGICAL: Alert and oriented, no focal deficits, no motor or sensory deficits.  · SKIN: Skin normal color for race, warm, dry and intact. No evidence of rash.    LABS:  CBC Full  -  ( 2020 06:10 )  WBC Count : 7.71 K/uL  RBC Count : 6.11 M/uL  Hemoglobin : 13.8 g/dL  Hematocrit : 43.1 %  Platelet Count - Automated : 227 K/uL  Mean Cell Volume : 70.5 fl  Mean Cell Hemoglobin : 22.6 pg  Mean Cell Hemoglobin Concentration : 32.0 gm/dL  Auto Neutrophil # : 5.18 K/uL  Auto Lymphocyte # : 1.72 K/uL  Auto Monocyte # : 0.65 K/uL  Auto Eosinophil # : 0.06 K/uL  Auto Basophil # : 0.04 K/uL  Auto Neutrophil % : 67.2 %  Auto Lymphocyte % : 22.3 %  Auto Monocyte % : 8.4 %  Auto Eosinophil % : 0.8 %  Auto Basophil % : 0.5 %    07-    140  |  112<H>  |  7   ----------------------------<  222<H>  3.6   |  11<L>  |  0.96    Ca    7.6<L>      2020 06:10  Phos  1.1     07-05  Mg     2.1     07-05    TPro  7.7  /  Alb  3.6  /  TBili  0.6  /  DBili  x   /  AST  21  /  ALT  54  /  AlkPhos  90  07-05    CAPILLARY BLOOD GLUCOSE      POCT Blood Glucose.: 257 mg/dL (2020 19:54)      Urinalysis Basic - ( 2020 18:46 )    Color: Yellow / Appearance: Clear / S.030 / pH: x  Gluc: x / Ketone: Large  / Bili: Negative / Urobili: Negative   Blood: x / Protein: 100 / Nitrite: Negative   Leuk Esterase: Negative / RBC: 2-5 /HPF / WBC 0-2 /HPF   Sq Epi: x / Non Sq Epi: Few /HPF / Bacteria: Trace /HPF Patient is a 20y old  Male who presents with a chief complaint of Constipation (2020 09:12)      HPI:  21 yo male with medical history significant for hypothyroidism(not on medication) comes in with constipation for past 4 days. He hasn't had a BM for past 4 days and was seen in Urgent care  yesterday. He was diagnosed with hemorrhoids and anal fissure, was prescribed lidocaine cream and advised for sitz bath. Also reports nausea and 1-2 episodes of NBNB vomiting after eating every day for 3 days. In ED he was found to have elevated blood sugars with anion gap and ketones in urine. He does not have history of diabetes. No history of abdominal pain, fever, headache, chest pain, urinary symptoms, increased thirst, increased frequency of urination. No h/o recent illness, trauma, sick contacts. (2020 01:27)      Allergies    No Known Allergies    Intolerances        MEDICATIONS  (STANDING):  dextrose 5% + sodium chloride 0.45% 1000 milliLiter(s) (150 mL/Hr) IV Continuous <Continuous>  insulin regular Infusion 12 Unit(s)/Hr (12 mL/Hr) IV Continuous <Continuous>  levothyroxine 25 MICROGram(s) Oral daily    MEDICATIONS  (PRN):  bisacodyl 5 milliGRAM(s) Oral every 12 hours PRN Constipation      Daily Height in cm: 186.69 (2020 17:12)    Daily     Drug Dosing Weight  Height (cm): 186.69 (2020 17:12)  Weight (kg): 126 (2020 17:12)  BMI (kg/m2): 36.2 (2020 17:12)  BSA (m2): 2.49 (2020 17:12)    PAST MEDICAL & SURGICAL HISTORY:  Hypothyroid      FAMILY HISTORY:  FH: hypothyroidism        grand parents : DM      SOCIAL HISTORY: Smokes [employer] & [employer]IP [employer]    ADVANCE DIRECTIVES: Full code    REVIEW OF SYSTEMS:    CONSTITUTIONAL: No fever, weight loss, or fatigue  EYES: No eye pain, visual disturbances, or discharge  ENMT:  No difficulty hearing, tinnitus, vertigo; No sinus or throat pain  NECK: No pain or stiffness  BREASTS: No pain, masses, or nipple discharge  RESPIRATORY: No cough, wheezing, chills or hemoptysis; No shortness of breath  CARDIOVASCULAR: No chest pain, palpitations, dizziness, or leg swelling  GASTROINTESTINAL: constipation, vomiting after eating food  GENITOURINARY: No dysuria, frequency, hematuria, or incontinence  NEUROLOGICAL: No headaches, memory loss, loss of strength, numbness, or tremors  SKIN: No itching, burning, rashes, or lesions   LYMPH NODES: No enlarged glands  ENDOCRINE: No heat or cold intolerance; No hair loss  MUSCULOSKELETAL: No joint pain or swelling; No muscle, back, or extremity pain  PSYCHIATRIC: No depression, anxiety, mood swings, or difficulty sleeping  HEME/LYMPH: No easy bruising, or bleeding gums  ALLERGY AND IMMUNOLOGIC: No hives or eczema          ICU Vital Signs Last 24 Hrs  T(C): 36.6 (2020 07:23), Max: 37.2 (2020 21:50)  T(F): 97.9 (2020 07:23), Max: 99 (2020 21:50)  HR: 100 (2020 07:23) (87 - 108)  BP: 133/81 (2020 07:23) (133/81 - 170/99)  RR: 17 (2020 07:23) (17 - 20)  SpO2: 100% (2020 07:23) (98% - 100%)      ABG - ( 2020 08:46 )  pH, Arterial: 7.22  pH, Blood: x     /  pCO2: 22    /  pO2: 122   / HCO3: 8     / Base Excess: -17.8 /  SaO2: 98        I&O's Detail      PHYSICAL EXAM:   CONSTITUTIONAL: Obese appearing, awake, alert, oriented to person, place, time/situation and in no apparent distress.   HEENT: NC , PERRLA, EOMI. moist oral mucosa  · EYES: Clear bilaterally, pupils equal, round and reactive to light.    THROAT: no adenopathy, no masses  · CARDIAC: Normal rate, regular rhythm.  Heart sounds S1, S2.  · RESPIRATORY: Breath sounds clear and equal bilaterally.  · GASTROINTESTINAL:  soft, nondistended  Abdominal Tenderness Location: right lower quadrant,  Bowel Sounds: present x 4 quadrants  · NEUROLOGICAL: Alert and oriented, no focal deficits, no motor or sensory deficits.    EXT: no c/c/e  · SKIN: Skin normal color for race, warm, dry and intact. No evidence of rash.   MSK: normal developed    LABS:  CBC Full  -  ( 2020 06:10 )  WBC Count : 7.71 K/uL  RBC Count : 6.11 M/uL  Hemoglobin : 13.8 g/dL  Hematocrit : 43.1 %  Platelet Count - Automated : 227 K/uL  Mean Cell Volume : 70.5 fl  Mean Cell Hemoglobin : 22.6 pg  Mean Cell Hemoglobin Concentration : 32.0 gm/dL  Auto Neutrophil # : 5.18 K/uL  Auto Lymphocyte # : 1.72 K/uL  Auto Monocyte # : 0.65 K/uL  Auto Eosinophil # : 0.06 K/uL  Auto Basophil # : 0.04 K/uL  Auto Neutrophil % : 67.2 %  Auto Lymphocyte % : 22.3 %  Auto Monocyte % : 8.4 %  Auto Eosinophil % : 0.8 %  Auto Basophil % : 0.5 %    07-05    140  |  112<H>  |  7   ----------------------------<  222<H>  3.6   |  11<L>  |  0.96    Ca    7.6<L>      2020 06:10  Phos  1.1     07-05  Mg     2.1     07-05    TPro  7.7  /  Alb  3.6  /  TBili  0.6  /  DBili  x   /  AST  21  /  ALT  54  /  AlkPhos  90  07-05    CAPILLARY BLOOD GLUCOSE      POCT Blood Glucose.: 257 mg/dL (2020 19:54)      Urinalysis Basic - ( 2020 18:46 )    Color: Yellow / Appearance: Clear / S.030 / pH: x  Gluc: x / Ketone: Large  / Bili: Negative / Urobili: Negative   Blood: x / Protein: 100 / Nitrite: Negative   Leuk Esterase: Negative / RBC: 2-5 /HPF / WBC 0-2 /HPF   Sq Epi: x / Non Sq Epi: Few /HPF / Bacteria: Trace /HPF

## 2020-07-05 NOTE — CONSULT NOTE ADULT - SUBJECTIVE AND OBJECTIVE BOX
DATE OF SERVICE:Patient was seen,examined and evaluated on-07/05/2020    CHIEF COMPLAINT:    HPI;21 yo male with medical history significant for hypothyroidism(not on medication) comes in with constipation for past 4 days. He hasn't had a BM for past 4 days and was seen in Urgent Care yesterday. He was diagnosed with hemorrhoids and anal fissure, was prescribed lidocaine cream and advised for sitz bath. Also reports nausea and 1-2 episodes of NBNB vomiting after eating every day for 3 days. In ED he was found to have elevated blood sugars with anion gap and ketones in urine.   He does not have history of diabetes.   No history of abdominal pain, fever, headache, chest pain, urinary symptoms, increased thirst, increased frequency of urination.   No h/o recent illness, trauma, sick contacts.   Admitted to ICU woth DKA,noted to be tachycardic no hx syncope chest pain or dyspnea    PAST MEDICAL & SURGICAL HISTORY:  Hypothyroid      MEDICATIONS  (STANDING):  atorvastatin 40 milliGRAM(s) Oral at bedtime  dextrose 5% + sodium chloride 0.45% with potassium chloride 20 mEq/L 1000 milliLiter(s) (200 mL/Hr) IV Continuous <Continuous>  enoxaparin Injectable 40 milliGRAM(s) SubCutaneous daily  insulin regular Infusion 2 Unit(s)/Hr (2 mL/Hr) IV Continuous <Continuous>  levothyroxine 25 MICROGram(s) Oral daily  pantoprazole  Injectable 40 milliGRAM(s) IV Push daily    MEDICATIONS  (PRN):  bisacodyl 5 milliGRAM(s) Oral every 12 hours PRN Constipation      FAMILY HISTORY:  FH: hypothyroidism  No family history of premature coronary artery disease or sudden cardiac death    SOCIAL HISTORY:  Smoking-No tabacco use  Alcohol-Denies  Ilicit Drug use-Denies    REVIEW OF SYSTEMS:  Constitutional: [ ] fever, [ ]weight loss, [x ]fatigue Activity [ ] Bedbound,[x ] Ambulates [x ] Unassisted[ ] Cane/Walker [ ] Assistence.  Eyes: [ ] visual changes  Respiratory: [ ]shortness of breath;  [ ] cough, [ ]wheezing, [ ]chills, [ ]hemoptysis  Cardiovascular: [ ] chest pain, [x ]palpitations, [ ]dizziness,  [ ]leg swelling[ ]orthopnea [ ]PND  Gastrointestinal: [ ] abdominal pain, [ ]nausea, [ ]vomiting,  [ ]diarrhea,[ ]constipation  Genitourinary: [ ] dysuria, [ ] hematuria  Neurologic: [ ] headaches [ ] tremors[ ] weakness  Skin: [ ] itching, [ ]burning, [ ] rashes  Endocrine: [ ] heat or cold intolerance  Musculoskeletal: [ ] joint pain or swelling; [ ] muscle, back, or extremity pain  Psychiatric: [ ] depression, [ ]anxiety, [ ]mood swings, or [ ]difficulty sleeping  Hematologic: [ ] easy bruising, [ ] bleeding gums       [ x] All others negative	  [ ] Unable to obtain    Vital Signs Last 24 Hrs    T(C): 36.4 (05 Jul 2020 16:00), Max: 37.2 (04 Jul 2020 21:50)  T(F): 97.6 (05 Jul 2020 16:00), Max: 99 (04 Jul 2020 21:50)  HR: 89 (05 Jul 2020 20:00) (80 - 129)  BP: 124/98 (05 Jul 2020 20:00) (121/73 - 159/77)  BP(mean): 105 (05 Jul 2020 20:00) (61 - 105)  RR: 14 (05 Jul 2020 20:00) (14 - 27)  SpO2: 100% (05 Jul 2020 20:00) (99% - 100%)    PHYSICAL EXAM:  General: No acute distress BMI-33.9  HEENT: EOMI, PERRL[ ] Icteric  Neck: Supple, No JVD  Lungs: Equal air entry bilaterally; [ ] Rales [ ] Rhonchi [ ] Wheezing  Heart: Regular rate and rhythm;[x ] Murmurs-  2 /6 [x ] Systolic [ ] Diastolic [ ] Radiation,No rubs, or gallops  Abdomen: Nontender, bowel sounds present  Extremities: No clubbing, cyanosis, or edema[ ] Calf tenderness  Nervous system:  Alert & Oriented X3, no focal deficits  Psychiatric: Normal affect  Skin: No rashes or lesions      LABS:            13.8   7.71  )-----------( 227      ( 05 Jul 2020 06:10 )             43.1     07-05    137  |  111<H>  |  5<L>  ----------------------------<  200<H>  3.7   |  11<L>  |  0.90    Ca    7.8<L>      05 Jul 2020 18:27  Phos  1.4     07-05  Mg     2.0     07-05    TPro  6.8  /  Alb  3.2<L>  /  TBili  0.5  /  DBili  x   /  AST  30  /  ALT  56  /  AlkPhos  78  07-05    Blood Gas Profile - Arterial (07.05.20 @ 08:46)    pH, Arterial: 7.22    pCO2, Arterial: 22 mmHg    pO2, Arterial: 122 mmHg    HCO3, Arterial: 8 mmol/L    Base Excess, Arterial: -17.8 mmol/L    Oxygen Saturation, Arterial: 98 %    FIO2, Arterial: 21    Blood Gas Comments Arterial: room air   right radial    Lipid Profile (07.05.20 @ 06:10)    Total Cholesterol/HDL Ratio Measurement: 6.4 RATIO    Cholesterol, Serum: 217 mg/dL    Triglycerides, Serum: 229 mg/dL    HDL Cholesterol, Serum: 34 mg/dL    Direct LDL: 137 mg/dL    POCT  Blood Glucose (07.05.20 @ 14:03)    POCT Blood Glucose.: 171 <---183 ,---226 mg/dL            RADIOLOGY:    ECG [my interpretation]:Sinus tachycardia normal intervals No acute ST T abnormalities    TELEMETRY:Sinus tachycardia PAC"s

## 2020-07-05 NOTE — PROGRESS NOTE ADULT - SUBJECTIVE AND OBJECTIVE BOX
SUBJECTIVE / OVERNIGHT EVENTS: pt denies chest pain, shortness of breath       MEDICATIONS  (STANDING):  atorvastatin 40 milliGRAM(s) Oral at bedtime  dextrose 5% + sodium chloride 0.45% with potassium chloride 20 mEq/L 1000 milliLiter(s) (200 mL/Hr) IV Continuous <Continuous>  enoxaparin Injectable 40 milliGRAM(s) SubCutaneous daily  insulin regular Infusion 2 Unit(s)/Hr (2 mL/Hr) IV Continuous <Continuous>  levothyroxine 25 MICROGram(s) Oral daily  pantoprazole  Injectable 40 milliGRAM(s) IV Push daily    MEDICATIONS  (PRN):  bisacodyl 5 milliGRAM(s) Oral every 12 hours PRN Constipation    Vital Signs Last 24 Hrs  T(C): 36.4 (2020 16:00), Max: 37.2 (2020 21:50)  T(F): 97.6 (2020 16:00), Max: 99 (2020 21:50)  HR: 89 (2020 20:00) (80 - 129)  BP: 124/98 (2020 20:00) (121/73 - 159/77)  BP(mean): 105 (2020 20:00) (61 - 105)  RR: 14 (2020 20:00) (14 - 27)  SpO2: 100% (2020 20:00) (99% - 100%)    CAPILLARY BLOOD GLUCOSE      POCT Blood Glucose.: 288 mg/dL (2020 21:09)  POCT Blood Glucose.: 237 mg/dL (2020 19:57)  POCT Blood Glucose.: 226 mg/dL (2020 19:08)  POCT Blood Glucose.: 222 mg/dL (2020 18:11)  POCT Blood Glucose.: 174 mg/dL (2020 17:00)  POCT Blood Glucose.: 133 mg/dL (2020 16:19)  POCT Blood Glucose.: 150 mg/dL (2020 15:26)  POCT Blood Glucose.: 171 mg/dL (2020 14:03)  POCT Blood Glucose.: 183 mg/dL (2020 12:55)  POCT Blood Glucose.: 226 mg/dL (2020 11:53)  POCT Blood Glucose.: 208 mg/dL (2020 10:08)    I&O's Summary    2020 07:01  -  2020 21:23  --------------------------------------------------------  IN: 2591.7 mL / OUT: 800 mL / NET: 1791.7 mL        Constitutional: No fever, fatigue  Skin: No rash.  Eyes: No recent vision problems or eye pain.  ENT: No congestion, ear pain, or sore throat.  Cardiovascular: No chest pain or palpation.  Respiratory: No cough, shortness of breath, congestion, or wheezing.  Gastrointestinal: No abdominal pain, nausea, vomiting, or diarrhea.  Genitourinary: No dysuria.  Musculoskeletal: No joint swelling.  Neurologic: No headache.    PHYSICAL EXAM:  GENERAL: NAD  EYES: EOMI, PERRLA  NECK: Supple, No JVD  CHEST/LUNG: dec breath sounds rt base  HEART:  S1 , S2 +  ABDOMEN: soft , bs+  EXTREMITIES:  no edema  NEUROLOGY:alert awake oriented       LABS:                        13.8   7.71  )-----------( 227      ( 2020 06:10 )             43.1     07-05    137  |  111<H>  |  5<L>  ----------------------------<  200<H>  3.7   |  11<L>  |  0.90    Ca    7.8<L>      2020 18:27  Phos  1.4     07-05  Mg     2.0     07-05    TPro  6.8  /  Alb  3.2<L>  /  TBili  0.5  /  DBili  x   /  AST  30  /  ALT  56  /  AlkPhos  78  07-05          Urinalysis Basic - ( 2020 18:46 )    Color: Yellow / Appearance: Clear / S.030 / pH: x  Gluc: x / Ketone: Large  / Bili: Negative / Urobili: Negative   Blood: x / Protein: 100 / Nitrite: Negative   Leuk Esterase: Negative / RBC: 2-5 /HPF / WBC 0-2 /HPF   Sq Epi: x / Non Sq Epi: Few /HPF / Bacteria: Trace /HPF        RADIOLOGY & ADDITIONAL TESTS:    Imaging Personally Reviewed:    Consultant(s) Notes Reviewed:      Care Discussed with Consultants/Other Providers:

## 2020-07-05 NOTE — H&P ADULT - HISTORY OF PRESENT ILLNESS
19 yo male with medical history significant for hypothyroidism(not on medication) comes in with constipation for past 4 days. He hasn't had a BM for past 4 days and was seen in UC yesterday. He was diagnosed with hemorrhoids and anal fissure, was prescribed lidocaine cream and advised for sitz bath. Also reports nausea and 1-2 episodes of NBNB vomiting after eating every day for 3 days. In ED he was found to have elevated blood sugars with anion gap and ketones in urine. He does not have history of diabetes. No history of abdominal pain, fever, headache, chest pain, urinary symptoms, increased thirst, increased frequency of urination. No h/o recent illness, trauma, sick contacts.

## 2020-07-05 NOTE — CONSULT NOTE ADULT - ATTENDING COMMENTS
Patient was seen and examined by me on 07/05/2020,interim events noted,labs and radiology studies reviewed.  Eddie Jeffery MD,FACC.  [address] [place] [address].  [id number].  [phone]
IMP: This is a 20 year-old  overweight male, with hypothyroidism, smoker  not on medication, presents with cc constipation x 4 days and vomiting for past 2 days, NBNB.  Patient was admitted to medicine initially but after enough fluid resuscitation acidosis did not resolve. Patient is being transferred to ICU for new onset DKA and life threatening metabolic acidosis    Diagnosis  -  DKA  -  New onset DM  -  Metabolic acidosis severe life threatening   -  Hypothyroid   -  Smoker  ( hooka pipe)  -  Dyslipidemia      Plan;  -admit to icu  -continue insulin gtt until gap closes  -start D5W to avoid hypoglycemia  -monitor BMP q4h  -supp electrolyte  -FS q1h  -NPO  -endo eval  -statin  -advise to stop smoking   -advise wt loss , exercise and diet

## 2020-07-05 NOTE — CONSULT NOTE ADULT - ASSESSMENT
20 year-old  overweight male, with medical history of hypothyroidism not on medication, presents with cc constipation x 4 days and vomiting for past 2 days,Noted to be in DKA tachycardic

## 2020-07-05 NOTE — H&P ADULT - PROBLEM SELECTOR PLAN 3
presented with BS of 300s and DKA  has not been to doctor for past 3 yrs  Likely DM was undiagnosed and is uncontrolled   Will start on sliding scale   Endo consult f/u

## 2020-07-05 NOTE — H&P ADULT - PROBLEM SELECTOR PLAN 4
Is obese with BMI of 36  Will need nutritional education  Could be partly due to untreated hypothyroidism  DIabetic education and nutritional consult

## 2020-07-05 NOTE — H&P ADULT - PROBLEM SELECTOR PLAN 5
Complains of no BM for past 4 days  was diagnosed with hemorrhoids at UC  could be due to diet or hypothyroidism  s/p miralax, dulcoloax  fibre diet

## 2020-07-06 LAB
ALBUMIN SERPL ELPH-MCNC: 2.9 G/DL — LOW (ref 3.5–5)
ALBUMIN SERPL ELPH-MCNC: 3 G/DL — LOW (ref 3.5–5)
ALBUMIN SERPL ELPH-MCNC: 3.6 G/DL — SIGNIFICANT CHANGE UP (ref 3.5–5)
ALP SERPL-CCNC: 70 U/L — SIGNIFICANT CHANGE UP (ref 40–120)
ALP SERPL-CCNC: 74 U/L — SIGNIFICANT CHANGE UP (ref 40–120)
ALP SERPL-CCNC: 87 U/L — SIGNIFICANT CHANGE UP (ref 40–120)
ALT FLD-CCNC: 57 U/L DA — SIGNIFICANT CHANGE UP (ref 10–60)
ALT FLD-CCNC: 60 U/L DA — SIGNIFICANT CHANGE UP (ref 10–60)
ALT FLD-CCNC: 77 U/L DA — HIGH (ref 10–60)
ANION GAP SERPL CALC-SCNC: 14 MMOL/L — SIGNIFICANT CHANGE UP (ref 5–17)
ANION GAP SERPL CALC-SCNC: 14 MMOL/L — SIGNIFICANT CHANGE UP (ref 5–17)
ANION GAP SERPL CALC-SCNC: 9 MMOL/L — SIGNIFICANT CHANGE UP (ref 5–17)
ANION GAP SERPL CALC-SCNC: 9 MMOL/L — SIGNIFICANT CHANGE UP (ref 5–17)
AST SERPL-CCNC: 32 U/L — SIGNIFICANT CHANGE UP (ref 10–40)
AST SERPL-CCNC: 33 U/L — SIGNIFICANT CHANGE UP (ref 10–40)
AST SERPL-CCNC: 44 U/L — HIGH (ref 10–40)
BASE EXCESS BLDV CALC-SCNC: -4.4 MMOL/L — LOW (ref -2–2)
BASE EXCESS BLDV CALC-SCNC: -6.3 MMOL/L — LOW (ref -2–2)
BASOPHILS # BLD AUTO: 0.05 K/UL — SIGNIFICANT CHANGE UP (ref 0–0.2)
BASOPHILS NFR BLD AUTO: 1 % — SIGNIFICANT CHANGE UP (ref 0–2)
BILIRUB SERPL-MCNC: 0.6 MG/DL — SIGNIFICANT CHANGE UP (ref 0.2–1.2)
BUN SERPL-MCNC: 2 MG/DL — LOW (ref 7–18)
BUN SERPL-MCNC: 3 MG/DL — LOW (ref 7–18)
CALCIUM SERPL-MCNC: 7.7 MG/DL — LOW (ref 8.4–10.5)
CALCIUM SERPL-MCNC: 7.7 MG/DL — LOW (ref 8.4–10.5)
CALCIUM SERPL-MCNC: 8.2 MG/DL — LOW (ref 8.4–10.5)
CALCIUM SERPL-MCNC: 8.3 MG/DL — LOW (ref 8.4–10.5)
CHLORIDE SERPL-SCNC: 106 MMOL/L — SIGNIFICANT CHANGE UP (ref 96–108)
CHLORIDE SERPL-SCNC: 107 MMOL/L — SIGNIFICANT CHANGE UP (ref 96–108)
CHLORIDE SERPL-SCNC: 109 MMOL/L — HIGH (ref 96–108)
CHLORIDE SERPL-SCNC: 109 MMOL/L — HIGH (ref 96–108)
CO2 SERPL-SCNC: 15 MMOL/L — LOW (ref 22–31)
CO2 SERPL-SCNC: 15 MMOL/L — LOW (ref 22–31)
CO2 SERPL-SCNC: 20 MMOL/L — LOW (ref 22–31)
CO2 SERPL-SCNC: 21 MMOL/L — LOW (ref 22–31)
CREAT SERPL-MCNC: 0.8 MG/DL — SIGNIFICANT CHANGE UP (ref 0.5–1.3)
CREAT SERPL-MCNC: 0.83 MG/DL — SIGNIFICANT CHANGE UP (ref 0.5–1.3)
CREAT SERPL-MCNC: 0.89 MG/DL — SIGNIFICANT CHANGE UP (ref 0.5–1.3)
CREAT SERPL-MCNC: 0.89 MG/DL — SIGNIFICANT CHANGE UP (ref 0.5–1.3)
EOSINOPHIL # BLD AUTO: 0.16 K/UL — SIGNIFICANT CHANGE UP (ref 0–0.5)
EOSINOPHIL NFR BLD AUTO: 3.3 % — SIGNIFICANT CHANGE UP (ref 0–6)
GLUCOSE BLDC GLUCOMTR-MCNC: 184 MG/DL — HIGH (ref 70–99)
GLUCOSE BLDC GLUCOMTR-MCNC: 189 MG/DL — HIGH (ref 70–99)
GLUCOSE BLDC GLUCOMTR-MCNC: 214 MG/DL — HIGH (ref 70–99)
GLUCOSE BLDC GLUCOMTR-MCNC: 220 MG/DL — HIGH (ref 70–99)
GLUCOSE BLDC GLUCOMTR-MCNC: 227 MG/DL — HIGH (ref 70–99)
GLUCOSE BLDC GLUCOMTR-MCNC: 229 MG/DL — HIGH (ref 70–99)
GLUCOSE BLDC GLUCOMTR-MCNC: 230 MG/DL — HIGH (ref 70–99)
GLUCOSE BLDC GLUCOMTR-MCNC: 231 MG/DL — HIGH (ref 70–99)
GLUCOSE BLDC GLUCOMTR-MCNC: 232 MG/DL — HIGH (ref 70–99)
GLUCOSE BLDC GLUCOMTR-MCNC: 234 MG/DL — HIGH (ref 70–99)
GLUCOSE BLDC GLUCOMTR-MCNC: 247 MG/DL — HIGH (ref 70–99)
GLUCOSE BLDC GLUCOMTR-MCNC: 249 MG/DL — HIGH (ref 70–99)
GLUCOSE BLDC GLUCOMTR-MCNC: 250 MG/DL — HIGH (ref 70–99)
GLUCOSE BLDC GLUCOMTR-MCNC: 254 MG/DL — HIGH (ref 70–99)
GLUCOSE BLDC GLUCOMTR-MCNC: 267 MG/DL — HIGH (ref 70–99)
GLUCOSE BLDC GLUCOMTR-MCNC: 268 MG/DL — HIGH (ref 70–99)
GLUCOSE BLDC GLUCOMTR-MCNC: 269 MG/DL — HIGH (ref 70–99)
GLUCOSE BLDC GLUCOMTR-MCNC: 269 MG/DL — HIGH (ref 70–99)
GLUCOSE BLDC GLUCOMTR-MCNC: 273 MG/DL — HIGH (ref 70–99)
GLUCOSE SERPL-MCNC: 186 MG/DL — HIGH (ref 70–99)
GLUCOSE SERPL-MCNC: 227 MG/DL — HIGH (ref 70–99)
GLUCOSE SERPL-MCNC: 230 MG/DL — HIGH (ref 70–99)
GLUCOSE SERPL-MCNC: 264 MG/DL — HIGH (ref 70–99)
HCO3 BLDV-SCNC: 18 MMOL/L — LOW (ref 21–29)
HCO3 BLDV-SCNC: 21 MMOL/L — SIGNIFICANT CHANGE UP (ref 21–29)
HCT VFR BLD CALC: 36.6 % — LOW (ref 39–50)
HGB BLD-MCNC: 12 G/DL — LOW (ref 13–17)
HOROWITZ INDEX BLDV+IHG-RTO: 21 — SIGNIFICANT CHANGE UP
HOROWITZ INDEX BLDV+IHG-RTO: 21 — SIGNIFICANT CHANGE UP
IMM GRANULOCYTES NFR BLD AUTO: 0.4 % — SIGNIFICANT CHANGE UP (ref 0–1.5)
LYMPHOCYTES # BLD AUTO: 1.88 K/UL — SIGNIFICANT CHANGE UP (ref 1–3.3)
LYMPHOCYTES # BLD AUTO: 38.4 % — SIGNIFICANT CHANGE UP (ref 13–44)
MAGNESIUM SERPL-MCNC: 1.9 MG/DL — SIGNIFICANT CHANGE UP (ref 1.6–2.6)
MAGNESIUM SERPL-MCNC: 1.9 MG/DL — SIGNIFICANT CHANGE UP (ref 1.6–2.6)
MAGNESIUM SERPL-MCNC: 2.1 MG/DL — SIGNIFICANT CHANGE UP (ref 1.6–2.6)
MAGNESIUM SERPL-MCNC: 2.2 MG/DL — SIGNIFICANT CHANGE UP (ref 1.6–2.6)
MCHC RBC-ENTMCNC: 22.4 PG — LOW (ref 27–34)
MCHC RBC-ENTMCNC: 32.8 GM/DL — SIGNIFICANT CHANGE UP (ref 32–36)
MCV RBC AUTO: 68.3 FL — LOW (ref 80–100)
MONOCYTES # BLD AUTO: 0.46 K/UL — SIGNIFICANT CHANGE UP (ref 0–0.9)
MONOCYTES NFR BLD AUTO: 9.4 % — SIGNIFICANT CHANGE UP (ref 2–14)
NEUTROPHILS # BLD AUTO: 2.32 K/UL — SIGNIFICANT CHANGE UP (ref 1.8–7.4)
NEUTROPHILS NFR BLD AUTO: 47.5 % — SIGNIFICANT CHANGE UP (ref 43–77)
NRBC # BLD: 0 /100 WBCS — SIGNIFICANT CHANGE UP (ref 0–0)
PCO2 BLDV: 34 MMHG — LOW (ref 35–50)
PCO2 BLDV: 41 MMHG — SIGNIFICANT CHANGE UP (ref 35–50)
PH BLDV: 7.32 — LOW (ref 7.35–7.45)
PH BLDV: 7.34 — LOW (ref 7.35–7.45)
PHOSPHATE SERPL-MCNC: 0.8 MG/DL — CRITICAL LOW (ref 2.5–4.5)
PHOSPHATE SERPL-MCNC: 1.2 MG/DL — LOW (ref 2.5–4.5)
PHOSPHATE SERPL-MCNC: 1.3 MG/DL — LOW (ref 2.5–4.5)
PHOSPHATE SERPL-MCNC: 1.4 MG/DL — LOW (ref 2.5–4.5)
PLATELET # BLD AUTO: 193 K/UL — SIGNIFICANT CHANGE UP (ref 150–400)
PO2 BLDV: 23 MMHG — LOW (ref 25–45)
PO2 BLDV: <20 MMHG — LOW (ref 25–45)
POTASSIUM SERPL-MCNC: 3.2 MMOL/L — LOW (ref 3.5–5.3)
POTASSIUM SERPL-MCNC: 3.4 MMOL/L — LOW (ref 3.5–5.3)
POTASSIUM SERPL-MCNC: 3.6 MMOL/L — SIGNIFICANT CHANGE UP (ref 3.5–5.3)
POTASSIUM SERPL-MCNC: 3.7 MMOL/L — SIGNIFICANT CHANGE UP (ref 3.5–5.3)
POTASSIUM SERPL-SCNC: 3.2 MMOL/L — LOW (ref 3.5–5.3)
POTASSIUM SERPL-SCNC: 3.4 MMOL/L — LOW (ref 3.5–5.3)
POTASSIUM SERPL-SCNC: 3.6 MMOL/L — SIGNIFICANT CHANGE UP (ref 3.5–5.3)
POTASSIUM SERPL-SCNC: 3.7 MMOL/L — SIGNIFICANT CHANGE UP (ref 3.5–5.3)
PROT SERPL-MCNC: 6.2 G/DL — SIGNIFICANT CHANGE UP (ref 6–8.3)
PROT SERPL-MCNC: 6.4 G/DL — SIGNIFICANT CHANGE UP (ref 6–8.3)
PROT SERPL-MCNC: 7.5 G/DL — SIGNIFICANT CHANGE UP (ref 6–8.3)
RBC # BLD: 5.36 M/UL — SIGNIFICANT CHANGE UP (ref 4.2–5.8)
RBC # FLD: 15.8 % — HIGH (ref 10.3–14.5)
SAO2 % BLDV: 37 % — LOW (ref 67–88)
SAO2 % BLDV: 52 % — LOW (ref 67–88)
SODIUM SERPL-SCNC: 136 MMOL/L — SIGNIFICANT CHANGE UP (ref 135–145)
SODIUM SERPL-SCNC: 136 MMOL/L — SIGNIFICANT CHANGE UP (ref 135–145)
SODIUM SERPL-SCNC: 138 MMOL/L — SIGNIFICANT CHANGE UP (ref 135–145)
SODIUM SERPL-SCNC: 138 MMOL/L — SIGNIFICANT CHANGE UP (ref 135–145)
WBC # BLD: 4.89 K/UL — SIGNIFICANT CHANGE UP (ref 3.8–10.5)
WBC # FLD AUTO: 4.89 K/UL — SIGNIFICANT CHANGE UP (ref 3.8–10.5)

## 2020-07-06 RX ORDER — LEVOTHYROXINE SODIUM 125 MCG
50 TABLET ORAL DAILY
Refills: 0 | Status: DISCONTINUED | OUTPATIENT
Start: 2020-07-06 | End: 2020-07-08

## 2020-07-06 RX ORDER — INSULIN GLARGINE 100 [IU]/ML
20 INJECTION, SOLUTION SUBCUTANEOUS AT BEDTIME
Refills: 0 | Status: DISCONTINUED | OUTPATIENT
Start: 2020-07-06 | End: 2020-07-07

## 2020-07-06 RX ORDER — INSULIN LISPRO 100/ML
4 VIAL (ML) SUBCUTANEOUS
Refills: 0 | Status: DISCONTINUED | OUTPATIENT
Start: 2020-07-06 | End: 2020-07-07

## 2020-07-06 RX ORDER — INSULIN GLARGINE 100 [IU]/ML
5 INJECTION, SOLUTION SUBCUTANEOUS ONCE
Refills: 0 | Status: COMPLETED | OUTPATIENT
Start: 2020-07-06 | End: 2020-07-06

## 2020-07-06 RX ORDER — INSULIN HUMAN 100 [IU]/ML
2 INJECTION, SOLUTION SUBCUTANEOUS
Qty: 100 | Refills: 0 | Status: DISCONTINUED | OUTPATIENT
Start: 2020-07-06 | End: 2020-07-06

## 2020-07-06 RX ORDER — INSULIN LISPRO 100/ML
VIAL (ML) SUBCUTANEOUS
Refills: 0 | Status: DISCONTINUED | OUTPATIENT
Start: 2020-07-06 | End: 2020-07-08

## 2020-07-06 RX ORDER — DEXTROSE 50 % IN WATER 50 %
12.5 SYRINGE (ML) INTRAVENOUS ONCE
Refills: 0 | Status: DISCONTINUED | OUTPATIENT
Start: 2020-07-06 | End: 2020-07-08

## 2020-07-06 RX ORDER — POTASSIUM PHOSPHATE, MONOBASIC POTASSIUM PHOSPHATE, DIBASIC 236; 224 MG/ML; MG/ML
30 INJECTION, SOLUTION INTRAVENOUS ONCE
Refills: 0 | Status: COMPLETED | OUTPATIENT
Start: 2020-07-06 | End: 2020-07-06

## 2020-07-06 RX ORDER — MAGNESIUM SULFATE 500 MG/ML
1 VIAL (ML) INJECTION ONCE
Refills: 0 | Status: COMPLETED | OUTPATIENT
Start: 2020-07-06 | End: 2020-07-06

## 2020-07-06 RX ORDER — CHLORHEXIDINE GLUCONATE 213 G/1000ML
1 SOLUTION TOPICAL
Refills: 0 | Status: DISCONTINUED | OUTPATIENT
Start: 2020-07-06 | End: 2020-07-08

## 2020-07-06 RX ORDER — INSULIN LISPRO 100/ML
4 VIAL (ML) SUBCUTANEOUS
Refills: 0 | Status: DISCONTINUED | OUTPATIENT
Start: 2020-07-06 | End: 2020-07-06

## 2020-07-06 RX ORDER — POTASSIUM PHOSPHATE, MONOBASIC POTASSIUM PHOSPHATE, DIBASIC 236; 224 MG/ML; MG/ML
15 INJECTION, SOLUTION INTRAVENOUS ONCE
Refills: 0 | Status: COMPLETED | OUTPATIENT
Start: 2020-07-06 | End: 2020-07-06

## 2020-07-06 RX ORDER — POTASSIUM CHLORIDE 20 MEQ
40 PACKET (EA) ORAL ONCE
Refills: 0 | Status: COMPLETED | OUTPATIENT
Start: 2020-07-06 | End: 2020-07-06

## 2020-07-06 RX ORDER — POTASSIUM CHLORIDE 20 MEQ
40 PACKET (EA) ORAL EVERY 4 HOURS
Refills: 0 | Status: COMPLETED | OUTPATIENT
Start: 2020-07-06 | End: 2020-07-06

## 2020-07-06 RX ADMIN — DEXTROSE MONOHYDRATE, SODIUM CHLORIDE, AND POTASSIUM CHLORIDE 200 MILLILITER(S): 50; .745; 4.5 INJECTION, SOLUTION INTRAVENOUS at 17:33

## 2020-07-06 RX ADMIN — ATORVASTATIN CALCIUM 40 MILLIGRAM(S): 80 TABLET, FILM COATED ORAL at 21:05

## 2020-07-06 RX ADMIN — CHLORHEXIDINE GLUCONATE 1 APPLICATION(S): 213 SOLUTION TOPICAL at 11:25

## 2020-07-06 RX ADMIN — ENOXAPARIN SODIUM 40 MILLIGRAM(S): 100 INJECTION SUBCUTANEOUS at 11:25

## 2020-07-06 RX ADMIN — Medication 25 MICROGRAM(S): at 05:04

## 2020-07-06 RX ADMIN — Medication 100 GRAM(S): at 02:44

## 2020-07-06 RX ADMIN — PANTOPRAZOLE SODIUM 40 MILLIGRAM(S): 20 TABLET, DELAYED RELEASE ORAL at 11:26

## 2020-07-06 RX ADMIN — Medication 40 MILLIEQUIVALENT(S): at 14:51

## 2020-07-06 RX ADMIN — POTASSIUM PHOSPHATE, MONOBASIC POTASSIUM PHOSPHATE, DIBASIC 83.33 MILLIMOLE(S): 236; 224 INJECTION, SOLUTION INTRAVENOUS at 17:32

## 2020-07-06 RX ADMIN — DEXTROSE MONOHYDRATE, SODIUM CHLORIDE, AND POTASSIUM CHLORIDE 200 MILLILITER(S): 50; .745; 4.5 INJECTION, SOLUTION INTRAVENOUS at 05:04

## 2020-07-06 RX ADMIN — INSULIN GLARGINE 5 UNIT(S): 100 INJECTION, SOLUTION SUBCUTANEOUS at 21:06

## 2020-07-06 RX ADMIN — DEXTROSE MONOHYDRATE, SODIUM CHLORIDE, AND POTASSIUM CHLORIDE 200 MILLILITER(S): 50; .745; 4.5 INJECTION, SOLUTION INTRAVENOUS at 11:38

## 2020-07-06 RX ADMIN — POTASSIUM PHOSPHATE, MONOBASIC POTASSIUM PHOSPHATE, DIBASIC 62.5 MILLIMOLE(S): 236; 224 INJECTION, SOLUTION INTRAVENOUS at 12:43

## 2020-07-06 RX ADMIN — Medication 40 MILLIEQUIVALENT(S): at 05:04

## 2020-07-06 RX ADMIN — Medication 2: at 22:36

## 2020-07-06 RX ADMIN — INSULIN GLARGINE 20 UNIT(S): 100 INJECTION, SOLUTION SUBCUTANEOUS at 18:11

## 2020-07-06 RX ADMIN — DEXTROSE MONOHYDRATE, SODIUM CHLORIDE, AND POTASSIUM CHLORIDE 200 MILLILITER(S): 50; .745; 4.5 INJECTION, SOLUTION INTRAVENOUS at 00:39

## 2020-07-06 RX ADMIN — Medication 40 MILLIEQUIVALENT(S): at 02:44

## 2020-07-06 NOTE — CONSULT NOTE ADULT - ASSESSMENT
19 yo male with medical history significant for hypothyroidism(not on medication) comes in with constipation for past 4 days. Found to be in DKA. Pt has fam hx of dm. Hx of hypothyroidism on lt4 till age 16?

## 2020-07-06 NOTE — PROGRESS NOTE ADULT - ASSESSMENT
20 year-old  overweight male, with medical history of hypothyroidism not on medication, presents with cc constipation x 4 days and vomiting for past 2 days, NBNB.  Patient was admitted to medicine initially but after enough fluid resuscitation acidosis did not resolve. Patient is being transferred to ICU for insulin drip and more close monitoring of BMP.    Diagnosis  1. DKA  2. Metabolic acidosis  3.Undiagnosed diabetes  4. Hypothyroidism    Plan:     Neuro:   AOx3  No issues  Not on any sedatives    Cardio:  No known cardiac history  EKG- NSR  Not in shock  s/p 4.5 L fluid resuscitation  c/w IVF    Respiratory:  No active issues, saturating well on room air    Infectious:  Normal WBC  No signs of infection    Nephro:  Metabolic acidosis, Anion gap closed  bicarb-11, sr cr-wnl  Potassium-3.6  Phosporous-1.1, replaced  c/w d5+half NS@150cc/hr  BMP q4. monitor electrolytes    Endocrine:  -New onset diabetes presenting with DKA, anion gap resolved after 4L of fluid resuscitation  - patient is still acidotic with Low bicarb-11  ABG showed pH of 7.22  Started on insulin dripn, finger stick q1, BMP q4  Fluids-D5+half NS with potassium supplement@150cc/hr  NPO for now  Monitor pH  MRA8X-13.8  Endo consult Dr MALOU Rogers low - s/p 75mM o/n    -Hypothyroidism:  has h/o hypothyroidism, not on treatment   TSh of 8.9. T3 1.56, T4 .9  On levothyroxine 25mg daily    GI:  CT abd/pelvis with hepatic steatosis and hepatomegaly  On atorvastatin  Recommend weight loss    Heme  Downtrending Hg - likely dilutional  Continue to trend    Prophylaxis:  On lovenox sub q  protonix IV 20 year-old  overweight male, with medical history of hypothyroidism not on medication, presents with cc constipation x 4 days and vomiting for past 2 days, NBNB.  Patient was admitted to medicine initially but after enough fluid resuscitation acidosis did not resolve. Patient is being transferred to ICU for insulin drip and more close monitoring of BMP.    Diagnosis  1. DKA  2. Metabolic acidosis  3.Undiagnosed diabetes  4. Hypothyroidism    Plan:     Neuro:   AOx3  No issues  Not on any sedatives    Cardio:  No known cardiac history  EKG- NSR  Not in shock  s/p 4.5 L fluid resuscitation  c/w IVF    Respiratory:  No active issues, saturating well on room air    Infectious:  Normal WBC  No signs of infection    Nephro:  Metabolic acidosis, Anion gap closed 20->9 now  Replacing potassium and phosphate  c/w d5+half NS@150cc/hr  BMP q4. Monitor electrolytes    Endocrine:  New onset diabetes presenting with DKA, anion gap resolved after 4L of fluid resuscitation  Patient still mildly acidodic - pH 7.34  Still on insulin drip, finger stick q1, BMP q4  Fluids-D5+half NS with potassium supplement@150cc/hr  NPO for now  Monitor pH  LPO2I-37.8  Endo consult Dr MALOU Curtis      -Hypothyroidism:  has h/o hypothyroidism, not on treatment   TSh of 8.9. T3 1.56, T4 .9  On levothyroxine 25mg daily    GI:  CT abd/pelvis with hepatic steatosis and hepatomegaly  Elevated lipid panel  On atorvastatin  Recommend weight loss    Heme  Downtrending Hg - likely dilutional  Continue to trend    Prophylaxis:  On lovenox sub q  protonix IV

## 2020-07-06 NOTE — CONSULT NOTE ADULT - PROBLEM SELECTOR RECOMMENDATION 9
DKA due to un controlled new onset DM  acidosis improving  no nausea/ vomiting  start clear liquid diet- advance prn  change to lantus 25 units qhs  and low dose humalog pre meals 4 units  dm teaching  nutrition consult

## 2020-07-06 NOTE — PROGRESS NOTE ADULT - SUBJECTIVE AND OBJECTIVE BOX
DATE OF SERVICE:Patient was seen and examined :07/06/2020    PRESENTING CC:Tachycardia    SUBJ: 19 yo male with medical history significant for hypothyroidism(not on medication) comes in with constipation for past 4 days. Admitted to ICU with DKA,noted to be tachycardic no hx syncope chest pain or dyspnea      PMH -reviewed admission note, no change since admission  Heart failure: acute [ ] chronic [ ] acute or chronic [ ] diastolic [ ] systolic [ ] combined systolic and diastolic[ ]  ANETA: ATN[ ] renal medullary necrosis [ ] CKD I [ ]CKDII [ ]CKD III [ ]CKD IV [ ]CKD V [ ]Other pathological lesions [ ]    MEDICATIONS  (STANDING):  atorvastatin 40 milliGRAM(s) Oral at bedtime  dextrose 5% + sodium chloride 0.45% with potassium chloride 20 mEq/L 1000 milliLiter(s) (200 mL/Hr) IV Continuous <Continuous>  enoxaparin Injectable 40 milliGRAM(s) SubCutaneous daily  insulin regular Infusion 2 Unit(s)/Hr (2 mL/Hr) IV Continuous <Continuous>  levothyroxine 25 MICROGram(s) Oral daily  pantoprazole  Injectable 40 milliGRAM(s) IV Push daily    MEDICATIONS  (PRN):  bisacodyl 5 milliGRAM(s) Oral every 12 hours PRN Constipation              REVIEW OF SYSTEMS:  Constitutional: [ ] fever, [ ]weight loss,  [x ]fatigue  Eyes: [ ] visual changes  Respiratory: [ ]shortness of breath;  [ ] cough, [ ]wheezing, [ ]chills, [ ]hemoptysis  Cardiovascular: [ ] chest pain, [ ]palpitations, [ ]dizziness,  [ ]leg swelling[ ]orthopnea[ ]PND  Gastrointestinal: [x ] abdominal pain, [ ]nausea, [ ]vomiting,  [ ]diarrhea   Genitourinary: [ ] dysuria, [ ] hematuria  Neurologic: [ ] headaches [ ] tremors[ ]weakness  Skin: [ ] itching, [ ]burning, [ ] rashes  Endocrine: [ ] heat or cold intolerance  Musculoskeletal: [ ] joint pain or swelling; [ ] muscle, back, or extremity pain  Psychiatric: [ ] depression, [ ]anxiety, [ ]mood swings, or [ ]difficulty sleeping  Hematologic: [ ] easy bruising, [ ] bleeding gums    [x] All remaining systems negative except as per above.   [ ]Unable to obtain.    Vital Signs Last 24 Hrs  T(C): 36.2 (06 Jul 2020 05:00), Max: 36.4 (05 Jul 2020 16:00)  T(F): 97.1 (06 Jul 2020 05:00), Max: 97.6 (05 Jul 2020 16:00)  HR: 79 (06 Jul 2020 08:00) (71 - 129)  BP: 127/77 (06 Jul 2020 08:00) (116/70 - 159/77)  BP(mean): 88 (06 Jul 2020 08:00) (61 - 105)  RR: 11 (06 Jul 2020 08:00) (11 - 27)  SpO2: 100% (06 Jul 2020 08:00) (100% - 100%)  I&O's Summary    05 Jul 2020 07:01  -  06 Jul 2020 07:00  --------------------------------------------------------  IN: 3016.2 mL / OUT: 2700 mL / NET: 316.2 mL        PHYSICAL EXAM:  General: No acute distress BMI-33.9  HEENT: EOMI, PERRL  Neck: Supple, [ ] JVD  Lungs: Equal air entry bilaterally; [ ] rales [ ] wheezing [ ] rhonchi  Heart: Regular rate and rhythm; [x ] murmur  2 /6 [x ] systolic [ ] diastolic [ ] radiation[ ] rubs [ ]  gallops  Abdomen: Nontender, bowel sounds present  Extremities: No clubbing, cyanosis, [ ] edema  Nervous system:  Alert & Oriented X3, no focal deficits  Psychiatric: Normal affect  Skin: No rashes or lesions    LABS:  07-06    138  |  109<H>  |  3<L>  ----------------------------<  264<H>  3.6   |  15<L>  |  0.89    Ca    7.7<L>      06 Jul 2020 06:02  Phos  1.4     07-06  Mg     2.2     07-06    TPro  6.2  /  Alb  3.0<L>  /  TBili  0.6  /  DBili  x   /  AST  32  /  ALT  60  /  AlkPhos  70  07-06    Creatinine Trend: 0.89<--, 0.80<--, 0.92<--, 0.90<--, 0.92<--, 0.86<--                        12.0   4.89  )-----------( 193      ( 06 Jul 2020 06:02 )             36.6           IMPRESSION AND PLAN:      20 year-old  overweight male, with medical history of hypothyroidism not on medication, presents with cc constipation x 4 days and vomiting for past 2 days,

## 2020-07-06 NOTE — CONSULT NOTE ADULT - SUBJECTIVE AND OBJECTIVE BOX
Patient is a 20y old  Male who presents with a chief complaint of Constipation (2020 09:57)      HPI:  21 yo male with medical history significant for hypothyroidism(not on medication) comes in with constipation for past 4 days. He hasn't had a BM for past 4 days and was seen in UC yesterday. He was diagnosed with hemorrhoids and anal fissure, was prescribed lidocaine cream and advised for sitz bath. Also reports nausea and 1-2 episodes of NBNB vomiting after eating every day for 3 days. In ED he was found to have elevated blood sugars with anion gap and ketones in urine. He does not have history of diabetes. No history of abdominal pain, fever, headache, chest pain, urinary symptoms, increased thirst, increased frequency of urination. No h/o recent illness, trauma, sick contacts. (2020 01:27)      PAST MEDICAL & SURGICAL HISTORY:  Hypothyroid         MEDICATIONS  (STANDING):  atorvastatin 40 milliGRAM(s) Oral at bedtime  dextrose 5% + sodium chloride 0.45% with potassium chloride 20 mEq/L 1000 milliLiter(s) (200 mL/Hr) IV Continuous <Continuous>  enoxaparin Injectable 40 milliGRAM(s) SubCutaneous daily  insulin regular Infusion 2 Unit(s)/Hr (2 mL/Hr) IV Continuous <Continuous>  levothyroxine 25 MICROGram(s) Oral daily  pantoprazole  Injectable 40 milliGRAM(s) IV Push daily    MEDICATIONS  (PRN):  bisacodyl 5 milliGRAM(s) Oral every 12 hours PRN Constipation      FAMILY HISTORY:  FH: hypothyroidism      SOCIAL HISTORY:      REVIEW OF SYSTEMS:  CONSTITUTIONAL: No fever, weight loss, or fatigue  EYES: No eye pain, visual disturbances, or discharge  ENT:  No difficulty hearing, tinnitus, vertigo; No sinus or throat pain  NECK: No pain or stiffness  RESPIRATORY: No cough, wheezing, chills or hemoptysis; No Shortness of Breath  CARDIOVASCULAR: No chest pain, palpitations, passing out, dizziness, or leg swelling  GASTROINTESTINAL: No abdominal or epigastric pain. No nausea, vomiting, or hematemesis; No diarrhea or constipation. No melena or hematochezia.  GENITOURINARY: No dysuria, frequency, hematuria, or incontinence  NEUROLOGICAL: No headaches, memory loss, loss of strength, numbness, or tremors  SKIN: No itching, burning, rashes, or lesions   LYMPH Nodes: No enlarged glands  ENDOCRINE: No heat or cold intolerance; No hair loss  MUSCULOSKELETAL: No joint pain or swelling; No muscle, back, or extremity pain  PSYCHIATRIC: No depression, anxiety, mood swings, or difficulty sleeping  HEME/LYMPH: No easy bruising, or bleeding gums  ALLERGY AND IMMUNOLOGIC: No hives or eczema	        Vital Signs Last 24 Hrs  T(C): 36.2 (2020 05:00), Max: 36.4 (2020 16:00)  T(F): 97.1 (2020 05:00), Max: 97.6 (2020 16:00)  HR: 79 (2020 10:00) (71 - 129)  BP: 132/73 (2020 10:00) (116/70 - 159/77)  BP(mean): 85 (2020 10:00) (61 - 105)  RR: 17 (2020 10:00) (11 - 27)  SpO2: 100% (2020 10:00) (100% - 100%)      Constitutional:    NC/AT:    HEENT:    Neck:  No JVD, bruits or thyromegaly    Respiratory:  Clear without rales or rhonchi    Cardiovascular:  RR without murmur, rub or gallop.    Gastrointestinal: Soft without hepatosplenomegaly.    Extremities: without cyanosis, clubbing or edema.    Neurological:  Oriented   x      . No gross sensory or motor defects.        LABS:                        12.0   4.89  )-----------( 193      ( 2020 06:02 )             36.6     07-06    138  |  109<H>  |  3<L>  ----------------------------<  264<H>  3.6   |  15<L>  |  0.89    Ca    7.7<L>      2020 06:02  Phos  1.4     07-06  Mg     2.2     07-06    TPro  6.2  /  Alb  3.0<L>  /  TBili  0.6  /  DBili  x   /  AST  32  /  ALT  60  /  AlkPhos  70  07-06          Urinalysis Basic - ( 2020 18:46 )    Color: Yellow / Appearance: Clear / S.030 / pH: x  Gluc: x / Ketone: Large  / Bili: Negative / Urobili: Negative   Blood: x / Protein: 100 / Nitrite: Negative   Leuk Esterase: Negative / RBC: 2-5 /HPF / WBC 0-2 /HPF   Sq Epi: x / Non Sq Epi: Few /HPF / Bacteria: Trace /HPF      CAPILLARY BLOOD GLUCOSE      POCT Blood Glucose.: 232 mg/dL (2020 09:17)  POCT Blood Glucose.: 249 mg/dL (2020 08:09)  POCT Blood Glucose.: 268 mg/dL (2020 06:53)  POCT Blood Glucose.: 269 mg/dL (2020 05:56)  POCT Blood Glucose.: 269 mg/dL (2020 05:19)  POCT Blood Glucose.: 231 mg/dL (2020 04:02)  POCT Blood Glucose.: 220 mg/dL (2020 02:59)  POCT Blood Glucose.: 189 mg/dL (2020 02:02)  POCT Blood Glucose.: 184 mg/dL (2020 01:01)  POCT Blood Glucose.: 230 mg/dL (2020 00:01)  POCT Blood Glucose.: 257 mg/dL (2020 23:01)  POCT Blood Glucose.: 267 mg/dL (2020 22:15)  POCT Blood Glucose.: 288 mg/dL (2020 21:09)  POCT Blood Glucose.: 237 mg/dL (2020 19:57)  POCT Blood Glucose.: 226 mg/dL (2020 19:08)  POCT Blood Glucose.: 222 mg/dL (2020 18:11)  POCT Blood Glucose.: 174 mg/dL (2020 17:00)  POCT Blood Glucose.: 133 mg/dL (2020 16:19)  POCT Blood Glucose.: 150 mg/dL (2020 15:26)  POCT Blood Glucose.: 171 mg/dL (2020 14:03)  POCT Blood Glucose.: 183 mg/dL (2020 12:55)  POCT Blood Glucose.: 226 mg/dL (2020 11:53)      RADIOLOGY & ADDITIONAL STUDIES: Patient is a 20y old  Male who presents with a chief complaint of Constipation (2020 09:57)      HPI:  19 yo male with medical history significant for hypothyroidism(not on medication) comes in with constipation for past 4 days. He hasn't had a BM for past 4 days and was seen in UC yesterday. He was diagnosed with hemorrhoids and anal fissure, was prescribed lidocaine cream and advised for sitz bath. Also reports nausea and 1-2 episodes of NBNB vomiting after eating every day for 3 days. In ED he was found to have elevated blood sugars with anion gap and ketones in urine. He does not have history of diabetes. No history of abdominal pain, fever, headache, chest pain, urinary symptoms, increased thirst, increased frequency of urination. No h/o recent illness, trauma, sick contacts. (2020 01:27)      PAST MEDICAL & SURGICAL HISTORY:  Hypothyroid         MEDICATIONS  (STANDING):  atorvastatin 40 milliGRAM(s) Oral at bedtime  dextrose 5% + sodium chloride 0.45% with potassium chloride 20 mEq/L 1000 milliLiter(s) (200 mL/Hr) IV Continuous <Continuous>  enoxaparin Injectable 40 milliGRAM(s) SubCutaneous daily  insulin regular Infusion 2 Unit(s)/Hr (2 mL/Hr) IV Continuous <Continuous>  levothyroxine 25 MICROGram(s) Oral daily  pantoprazole  Injectable 40 milliGRAM(s) IV Push daily    MEDICATIONS  (PRN):  bisacodyl 5 milliGRAM(s) Oral every 12 hours PRN Constipation      FAMILY HISTORY:  FH: hypothyroidism      SOCIAL HISTORY:      REVIEW OF SYSTEMS:  CONSTITUTIONAL: No fever, weight loss, or fatigue  EYES: No eye pain, visual disturbances, or discharge  ENT:  No difficulty hearing, tinnitus, vertigo; No sinus or throat pain  NECK: No pain or stiffness  RESPIRATORY: No cough, wheezing, chills or hemoptysis; No Shortness of Breath  CARDIOVASCULAR: No chest pain, palpitations, passing out, dizziness, or leg swelling  GASTROINTESTINAL: No abdominal or epigastric pain. No nausea, vomiting, or hematemesis; No diarrhea or constipation. No melena or hematochezia.  GENITOURINARY: No dysuria, frequency, hematuria, or incontinence  NEUROLOGICAL: No headaches, memory loss, loss of strength, numbness, or tremors  SKIN: No itching, burning, rashes, or lesions   LYMPH Nodes: No enlarged glands  ENDOCRINE: No heat or cold intolerance; No hair loss  MUSCULOSKELETAL: No joint pain or swelling; No muscle, back, or extremity pain  PSYCHIATRIC: No depression, anxiety, mood swings, or difficulty sleeping  HEME/LYMPH: No easy bruising, or bleeding gums  ALLERGY AND IMMUNOLOGIC: No hives or eczema	        Vital Signs Last 24 Hrs  T(C): 36.2 (2020 05:00), Max: 36.4 (2020 16:00)  T(F): 97.1 (2020 05:00), Max: 97.6 (2020 16:00)  HR: 79 (2020 10:00) (71 - 129)  BP: 132/73 (2020 10:00) (116/70 - 159/77)  BP(mean): 85 (2020 10:00) (61 - 105)  RR: 17 (2020 10:00) (11 - 27)  SpO2: 100% (2020 10:00) (100% - 100%)      Constitutional:    HEENT: nad    Neck:  No JVD, bruits or thyromegaly    Respiratory:  Clear without rales or rhonchi    Cardiovascular:  RR without murmur, rub or gallop.    Gastrointestinal: Soft without hepatosplenomegaly.    Extremities: without cyanosis, clubbing or edema.    Neurological:  Oriented   x   3   . No gross sensory or motor defects.        LABS:                        12.0   4.89  )-----------( 193      ( 2020 06:02 )             36.6     07-06    138  |  109<H>  |  3<L>  ----------------------------<  264<H>  3.6   |  15<L>  |  0.89    Ca    7.7<L>      2020 06:02  Phos  1.4     07-06  Mg     2.2     07-06    TPro  6.2  /  Alb  3.0<L>  /  TBili  0.6  /  DBili  x   /  AST  32  /  ALT  60  /  AlkPhos  70  07-06          Urinalysis Basic - ( 2020 18:46 )    Color: Yellow / Appearance: Clear / S.030 / pH: x  Gluc: x / Ketone: Large  / Bili: Negative / Urobili: Negative   Blood: x / Protein: 100 / Nitrite: Negative   Leuk Esterase: Negative / RBC: 2-5 /HPF / WBC 0-2 /HPF   Sq Epi: x / Non Sq Epi: Few /HPF / Bacteria: Trace /HPF      A1C with Estimated Average Glucose (20 @ 11:13)    A1C with Estimated Average Glucose Result: 11.5: Method: Immunoassay          Thyroid Stimulating Hormone, Serum (20 @ 06:10)    Thyroid Stimulating Hormone, Serum: 8.48 uU/mL    Free Thyroxine, Serum in AM (20 @ 15:23)    Free Thyroxine, Serum: 0.9 ng/dL          CAPILLARY BLOOD GLUCOSE      POCT Blood Glucose.: 232 mg/dL (2020 09:17)  POCT Blood Glucose.: 249 mg/dL (2020 08:09)  POCT Blood Glucose.: 268 mg/dL (2020 06:53)  POCT Blood Glucose.: 269 mg/dL (2020 05:56)  POCT Blood Glucose.: 269 mg/dL (2020 05:19)  POCT Blood Glucose.: 231 mg/dL (2020 04:02)  POCT Blood Glucose.: 220 mg/dL (2020 02:59)  POCT Blood Glucose.: 189 mg/dL (2020 02:02)  POCT Blood Glucose.: 184 mg/dL (2020 01:01)  POCT Blood Glucose.: 230 mg/dL (2020 00:01)  POCT Blood Glucose.: 257 mg/dL (2020 23:01)  POCT Blood Glucose.: 267 mg/dL (2020 22:15)  POCT Blood Glucose.: 288 mg/dL (2020 21:09)  POCT Blood Glucose.: 237 mg/dL (2020 19:57)  POCT Blood Glucose.: 226 mg/dL (2020 19:08)  POCT Blood Glucose.: 222 mg/dL (2020 18:11)  POCT Blood Glucose.: 174 mg/dL (2020 17:00)  POCT Blood Glucose.: 133 mg/dL (2020 16:19)  POCT Blood Glucose.: 150 mg/dL (2020 15:26)  POCT Blood Glucose.: 171 mg/dL (2020 14:03)  POCT Blood Glucose.: 183 mg/dL (2020 12:55)  POCT Blood Glucose.: 226 mg/dL (2020 11:53)      RADIOLOGY & ADDITIONAL STUDIES:

## 2020-07-06 NOTE — PROGRESS NOTE ADULT - SUBJECTIVE AND OBJECTIVE BOX
INTERVAL HPI/OVERNIGHT EVENTS: Anion gap closed    PRESSORS: [] YES [x] NO     WHICH:    Antimicrobial:    Cardiovascular:    Pulmonary:    Hematalogic:  enoxaparin Injectable 40 milliGRAM(s) SubCutaneous daily    Other:  atorvastatin 40 milliGRAM(s) Oral at bedtime  bisacodyl 5 milliGRAM(s) Oral every 12 hours PRN  dextrose 5% + sodium chloride 0.45% with potassium chloride 20 mEq/L 1000 milliLiter(s) IV Continuous <Continuous>  insulin regular Infusion 2 Unit(s)/Hr IV Continuous <Continuous>  levothyroxine 25 MICROGram(s) Oral daily  pantoprazole  Injectable 40 milliGRAM(s) IV Push daily    atorvastatin 40 milliGRAM(s) Oral at bedtime  bisacodyl 5 milliGRAM(s) Oral every 12 hours PRN  dextrose 5% + sodium chloride 0.45% with potassium chloride 20 mEq/L 1000 milliLiter(s) IV Continuous <Continuous>  enoxaparin Injectable 40 milliGRAM(s) SubCutaneous daily  insulin regular Infusion 2 Unit(s)/Hr IV Continuous <Continuous>  levothyroxine 25 MICROGram(s) Oral daily  pantoprazole  Injectable 40 milliGRAM(s) IV Push daily    Drug Dosing Weight  Height (cm): 188 (2020 11:30)  Weight (kg): 119.7 (2020 11:30)  BMI (kg/m2): 33.9 (2020 11:30)  BSA (m2): 2.45 (2020 11:30)    CENTRAL LINE: [] YES [x] NO  LOCATION:   DATE INSERTED:  REMOVE: [] YES [] NO  EXPLAIN:    PETERSON: [] YES [x] NO    DATE INSERTED:  REMOVE:  [] YES [x] NO  EXPLAIN: Strict IOs    A-LINE:  [] YES [x] NO  LOCATION:   DATE INSERTED:  REMOVE:  [] YES [] NO  EXPLAIN:    PMH -reviewed admission note, no change since admission  PAST MEDICAL & SURGICAL HISTORY:  Hypothyroid      ICU Vital Signs Last 24 Hrs  T(C): 36.2 (2020 05:00), Max: 36.6 (2020 07:23)  T(F): 97.1 (2020 05:00), Max: 97.9 (2020 07:23)  HR: 74 (2020 06:00) (71 - 129)  BP: 126/67 (2020 06:00) (116/70 - 159/77)  BP(mean): 82 (2020 06:00) (61 - 105)  ABP: --  ABP(mean): --  RR: 12 (2020 06:00) (12 - 27)  SpO2: 100% (2020 06:00) (100% - 100%)      ABG - ( 2020 08:46 )  pH, Arterial: 7.22  pH, Blood: x     /  pCO2: 22    /  pO2: 122   / HCO3: 8     / Base Excess: -17.8 /  SaO2: 98                            REVIEW OF SYSTEMS:    Unable to complete - patient intubated  CONSTITUTIONAL: No weakness, fevers or chills  NECK: No pain or stiffness  RESPIRATORY: No cough, wheezing, hemoptysis; No shortness of breath  CARDIOVASCULAR: No chest pain or palpitations  GASTROINTESTINAL: No abdominal or epigastric pain. No nausea, vomiting, No diarrhea or constipation. No melena or hematochezia.  GENITOURINARY: No dysuria, frequency or hematuria  NEUROLOGICAL: No numbness or weakness  All other review of systems is negative unless indicated above        PHYSICAL EXAM:    GENERAL: NAD, well-developed  HEAD:  Atraumatic, Normocephalic  EYES: EOMI, PERRLA, conjunctiva and sclera clear  NECK: Supple, normal appearance, No JVD; Normal thyroid; Trachea midline  NERVOUS SYSTEM:  Alert & Oriented X3, Motor Strength 5/5 B/L upper and lower extremities; DTRs 2+ intact and symmetric  CHEST/LUNG: No chest deformity; Lungs clear to auscultation b/l. No rales, rhonchi, wheezing   HEART: Regular rate and rhythm; No murmurs, rubs, or gallops  ABDOMEN: Soft, Nontender, Nondistended; Bowel sounds present  EXTREMITIES:  Peripheral Pulses intact, No clubbing, cyanosis, or edema  SKIN: No rashes or lesions    LABS:  CBC Full  -  ( 2020 06:02 )  WBC Count : 4.89 K/uL  RBC Count : 5.36 M/uL  Hemoglobin : 12.0 g/dL  Hematocrit : 36.6 %  Platelet Count - Automated : 193 K/uL  Mean Cell Volume : 68.3 fl  Mean Cell Hemoglobin : 22.4 pg  Mean Cell Hemoglobin Concentration : 32.8 gm/dL  Auto Neutrophil # : 2.32 K/uL  Auto Lymphocyte # : 1.88 K/uL  Auto Monocyte # : 0.46 K/uL  Auto Eosinophil # : 0.16 K/uL  Auto Basophil # : 0.05 K/uL  Auto Neutrophil % : 47.5 %  Auto Lymphocyte % : 38.4 %  Auto Monocyte % : 9.4 %  Auto Eosinophil % : 3.3 %  Auto Basophil % : 1.0 %    07-06    138  |  109<H>  |  3<L>  ----------------------------<  186<H>  3.2<L>   |  15<L>  |  0.80    Ca    7.7<L>      2020 02:17  Phos  1.3     07-  Mg     1.9     07-    TPro  6.4  /  Alb  2.9<L>  /  TBili  0.6  /  DBili  x   /  AST  33  /  ALT  57  /  AlkPhos  74  07-06    Urinalysis Basic - ( 2020 18:46 )    Color: Yellow / Appearance: Clear / S.030 / pH: x  Gluc: x / Ketone: Large  / Bili: Negative / Urobili: Negative   Blood: x / Protein: 100 / Nitrite: Negative   Leuk Esterase: Negative / RBC: 2-5 /HPF / WBC 0-2 /HPF   Sq Epi: x / Non Sq Epi: Few /HPF / Bacteria: Trace /HPF          RADIOLOGY & ADDITIONAL STUDIES REVIEWED:    < from: CT Abdomen and Pelvis w/ IV Cont (20 @ 19:45) >  EXAM:  CT ABDOMEN AND PELVIS IC                            PROCEDURE DATE:  2020          INTERPRETATION:  CLINICAL INFORMATION: Right lower quadrant tenderness, rule out appendicitis.    < end of copied text >  < from: CT Abdomen and Pelvis w/ IV Cont (20 @ 19:45) >    IMPRESSION:   Normal appendix.  Hepatic steatosis and hepatomegaly.    < end of copied text > INTERVAL HPI/OVERNIGHT EVENTS: Anion gap closed, Phophate and potassium persistently low - repleting. Patient comfortable in no acute distress.     PRESSORS: [] YES [x] NO     WHICH:    Antimicrobial:    Cardiovascular:    Pulmonary:    Hematalogic:  enoxaparin Injectable 40 milliGRAM(s) SubCutaneous daily    Other:  atorvastatin 40 milliGRAM(s) Oral at bedtime  bisacodyl 5 milliGRAM(s) Oral every 12 hours PRN  dextrose 5% + sodium chloride 0.45% with potassium chloride 20 mEq/L 1000 milliLiter(s) IV Continuous <Continuous>  insulin regular Infusion 2 Unit(s)/Hr IV Continuous <Continuous>  levothyroxine 25 MICROGram(s) Oral daily  pantoprazole  Injectable 40 milliGRAM(s) IV Push daily    atorvastatin 40 milliGRAM(s) Oral at bedtime  bisacodyl 5 milliGRAM(s) Oral every 12 hours PRN  dextrose 5% + sodium chloride 0.45% with potassium chloride 20 mEq/L 1000 milliLiter(s) IV Continuous <Continuous>  enoxaparin Injectable 40 milliGRAM(s) SubCutaneous daily  insulin regular Infusion 2 Unit(s)/Hr IV Continuous <Continuous>  levothyroxine 25 MICROGram(s) Oral daily  pantoprazole  Injectable 40 milliGRAM(s) IV Push daily    Drug Dosing Weight  Height (cm): 188 (2020 11:30)  Weight (kg): 119.7 (2020 11:30)  BMI (kg/m2): 33.9 (2020 11:30)  BSA (m2): 2.45 (2020 11:30)    CENTRAL LINE: [] YES [x] NO  LOCATION:   DATE INSERTED:  REMOVE: [] YES [] NO  EXPLAIN:    PETERSON: [] YES [x] NO    DATE INSERTED:  REMOVE:  [] YES [x] NO  EXPLAIN:     A-LINE:  [] YES [x] NO  LOCATION:   DATE INSERTED:  REMOVE:  [] YES [] NO  EXPLAIN:    PMH -reviewed admission note, no change since admission  PAST MEDICAL & SURGICAL HISTORY:  Hypothyroid      ICU Vital Signs Last 24 Hrs  T(C): 36.2 (2020 05:00), Max: 36.6 (2020 07:23)  T(F): 97.1 (2020 05:00), Max: 97.9 (2020 07:23)  HR: 74 (2020 06:00) (71 - 129)  BP: 126/67 (2020 06:00) (116/70 - 159/77)  BP(mean): 82 (2020 06:00) (61 - 105)  ABP: --  ABP(mean): --  RR: 12 (2020 06:00) (12 - 27)  SpO2: 100% (2020 06:00) (100% - 100%)      ABG - ( 2020 08:46 )  pH, Arterial: 7.22  pH, Blood: x     /  pCO2: 22    /  pO2: 122   / HCO3: 8     / Base Excess: -17.8 /  SaO2: 98                            REVIEW OF SYSTEMS:    CONSTITUTIONAL: No weakness, fevers or chills  NECK: No pain or stiffness  RESPIRATORY: No cough, wheezing, hemoptysis; No shortness of breath  CARDIOVASCULAR: No chest pain or palpitations  GASTROINTESTINAL: No abdominal or epigastric pain. No nausea, vomiting, No diarrhea or constipation. No melena or hematochezia.  GENITOURINARY: No dysuria, frequency or hematuria  NEUROLOGICAL: No numbness or weakness  All other review of systems is negative unless indicated above    PHYSICAL EXAM:    GENERAL: NAD, well-developed  HEAD:  Atraumatic, Normocephalic  EYES: EOMI, PERRLA, conjunctiva and sclera clear  NECK: Supple, normal appearance, No JVD; Normal thyroid; Trachea midline  NERVOUS SYSTEM:  Alert & Oriented X3, Moving all 4 extremities  CHEST/LUNG: No chest deformity; Lungs clear to auscultation b/l. No rales, rhonchi, wheezing   HEART: Regular rate and rhythm; No murmurs, rubs, or gallops  ABDOMEN: Soft, Nontender, Nondistended; Bowel sounds present  EXTREMITIES:  Peripheral Pulses intact, No clubbing, cyanosis, or edema  SKIN: No rashes or lesions    LABS:  CBC Full  -  ( 2020 06:02 )  WBC Count : 4.89 K/uL  RBC Count : 5.36 M/uL  Hemoglobin : 12.0 g/dL  Hematocrit : 36.6 %  Platelet Count - Automated : 193 K/uL  Mean Cell Volume : 68.3 fl  Mean Cell Hemoglobin : 22.4 pg  Mean Cell Hemoglobin Concentration : 32.8 gm/dL  Auto Neutrophil # : 2.32 K/uL  Auto Lymphocyte # : 1.88 K/uL  Auto Monocyte # : 0.46 K/uL  Auto Eosinophil # : 0.16 K/uL  Auto Basophil # : 0.05 K/uL  Auto Neutrophil % : 47.5 %  Auto Lymphocyte % : 38.4 %  Auto Monocyte % : 9.4 %  Auto Eosinophil % : 3.3 %  Auto Basophil % : 1.0 %    07-    138  |  109<H>  |  3<L>  ----------------------------<  186<H>  3.2<L>   |  15<L>  |  0.80    Ca    7.7<L>      2020 02:17  Phos  1.3     07-  Mg     1.9     -    TPro  6.4  /  Alb  2.9<L>  /  TBili  0.6  /  DBili  x   /  AST  33  /  ALT  57  /  AlkPhos  74  07-    Urinalysis Basic - ( 2020 18:46 )    Color: Yellow / Appearance: Clear / S.030 / pH: x  Gluc: x / Ketone: Large  / Bili: Negative / Urobili: Negative   Blood: x / Protein: 100 / Nitrite: Negative   Leuk Esterase: Negative / RBC: 2-5 /HPF / WBC 0-2 /HPF   Sq Epi: x / Non Sq Epi: Few /HPF / Bacteria: Trace /HPF          RADIOLOGY & ADDITIONAL STUDIES REVIEWED:    < from: CT Abdomen and Pelvis w/ IV Cont (20 @ 19:45) >  EXAM:  CT ABDOMEN AND PELVIS IC                            PROCEDURE DATE:  2020          INTERPRETATION:  CLINICAL INFORMATION: Right lower quadrant tenderness, rule out appendicitis.    < end of copied text >  < from: CT Abdomen and Pelvis w/ IV Cont (20 @ 19:45) >    IMPRESSION:   Normal appendix.  Hepatic steatosis and hepatomegaly.    < end of copied text >

## 2020-07-06 NOTE — PROGRESS NOTE ADULT - SUBJECTIVE AND OBJECTIVE BOX
SUBJECTIVE / OVERNIGHT EVENTS: pt denies chest pain, shortness of breath     MEDICATIONS  (STANDING):  atorvastatin 40 milliGRAM(s) Oral at bedtime  chlorhexidine 2% Cloths 1 Application(s) Topical <User Schedule>  dextrose 50% Injectable 12.5 Gram(s) IV Push once  enoxaparin Injectable 40 milliGRAM(s) SubCutaneous daily  insulin glargine Injectable (LANTUS) 20 Unit(s) SubCutaneous at bedtime  insulin lispro (HumaLOG) corrective regimen sliding scale   SubCutaneous Before meals and at bedtime  insulin lispro Injectable (HumaLOG) 4 Unit(s) SubCutaneous three times a day before meals  levothyroxine 50 MICROGram(s) Oral daily  pantoprazole  Injectable 40 milliGRAM(s) IV Push daily    MEDICATIONS  (PRN):  bisacodyl 5 milliGRAM(s) Oral every 12 hours PRN Constipation    Vital Signs Last 24 Hrs  T(C): 36.7 (2020 21:00), Max: 36.7 (2020 21:00)  T(F): 98 (2020 21:00), Max: 98 (2020 21:00)  HR: 95 (2020 22:00) (70 - 98)  BP: 116/69 (2020 22:00) (116/69 - 144/67)  BP(mean): 80 (2020 22:00) (71 - 92)  RR: 22 (2020 22:00) (11 - 24)  SpO2: 100% (2020 22:00) (100% - 100%)      Constitutional: No fever, fatigue  Skin: No rash.  Eyes: No recent vision problems or eye pain.  ENT: No congestion, ear pain, or sore throat.  Cardiovascular: No chest pain or palpation.  Respiratory: No cough, shortness of breath, congestion, or wheezing.  Gastrointestinal: No abdominal pain, nausea, vomiting, or diarrhea.  Genitourinary: No dysuria.  Musculoskeletal: No joint swelling.  Neurologic: No headache.    PHYSICAL EXAM:  GENERAL: NAD  EYES: EOMI, PERRLA  NECK: Supple, No JVD  CHEST/LUNG: dec breath sounds rt base  HEART:  S1 , S2 +  ABDOMEN: soft , bs+  EXTREMITIES:  no edema  NEUROLOGY:alert awake oriented     LABS:      136  |  107  |  2<L>  ----------------------------<  230<H>  3.7   |  20<L>  |  0.83    Ca    8.2<L>      2020 16:34  Phos  1.2     07-  Mg     1.9     07-    TPro  7.5  /  Alb  3.6  /  TBili  0.6  /  DBili      /  AST  44<H>  /  ALT  77<H>  /  AlkPhos  87  07-    Creatinine Trend: 0.83 <--, 0.89 <--, 0.89 <--, 0.80 <--, 0.92 <--, 0.90 <--, 0.92 <--, 0.86 <--, 0.96 <--, 0.97 <--, 0.92 <--, 1.23 <--                        12.0   4.89  )-----------( 193      ( 2020 06:02 )             36.6     Urine Studies:  Urinalysis Basic - ( 2020 18:46 )    Color: Yellow / Appearance: Clear / S.030 / pH:   Gluc:  / Ketone: Large  / Bili: Negative / Urobili: Negative   Blood:  / Protein: 100 / Nitrite: Negative   Leuk Esterase: Negative / RBC: 2-5 /HPF / WBC 0-2 /HPF   Sq Epi:  / Non Sq Epi: Few /HPF / Bacteria: Trace /HPF            ABG - ( 2020 08:46 )  pH, Arterial: 7.22  pH, Blood: x     /  pCO2: 22    /  pO2: 122   / HCO3: 8     / Base Excess: -17.8 /  SaO2: 98                LIVER FUNCTIONS - ( 2020 12:18 )  Alb: 3.6 g/dL / Pro: 7.5 g/dL / ALK PHOS: 87 U/L / ALT: 77 U/L DA / AST: 44 U/L / GGT: x

## 2020-07-07 DIAGNOSIS — E83.39 OTHER DISORDERS OF PHOSPHORUS METABOLISM: ICD-10-CM

## 2020-07-07 DIAGNOSIS — Z29.9 ENCOUNTER FOR PROPHYLACTIC MEASURES, UNSPECIFIED: ICD-10-CM

## 2020-07-07 DIAGNOSIS — Z02.9 ENCOUNTER FOR ADMINISTRATIVE EXAMINATIONS, UNSPECIFIED: ICD-10-CM

## 2020-07-07 DIAGNOSIS — E11.65 TYPE 2 DIABETES MELLITUS WITH HYPERGLYCEMIA: ICD-10-CM

## 2020-07-07 LAB
ANION GAP SERPL CALC-SCNC: 12 MMOL/L — SIGNIFICANT CHANGE UP (ref 5–17)
ANION GAP SERPL CALC-SCNC: 12 MMOL/L — SIGNIFICANT CHANGE UP (ref 5–17)
BUN SERPL-MCNC: 5 MG/DL — LOW (ref 7–18)
BUN SERPL-MCNC: 6 MG/DL — LOW (ref 7–18)
CALCIUM SERPL-MCNC: 8.4 MG/DL — SIGNIFICANT CHANGE UP (ref 8.4–10.5)
CALCIUM SERPL-MCNC: 8.6 MG/DL — SIGNIFICANT CHANGE UP (ref 8.4–10.5)
CHLORIDE SERPL-SCNC: 103 MMOL/L — SIGNIFICANT CHANGE UP (ref 96–108)
CHLORIDE SERPL-SCNC: 105 MMOL/L — SIGNIFICANT CHANGE UP (ref 96–108)
CO2 SERPL-SCNC: 20 MMOL/L — LOW (ref 22–31)
CO2 SERPL-SCNC: 21 MMOL/L — LOW (ref 22–31)
CREAT SERPL-MCNC: 0.74 MG/DL — SIGNIFICANT CHANGE UP (ref 0.5–1.3)
CREAT SERPL-MCNC: 0.82 MG/DL — SIGNIFICANT CHANGE UP (ref 0.5–1.3)
GLUCOSE BLDC GLUCOMTR-MCNC: 242 MG/DL — HIGH (ref 70–99)
GLUCOSE BLDC GLUCOMTR-MCNC: 247 MG/DL — HIGH (ref 70–99)
GLUCOSE BLDC GLUCOMTR-MCNC: 271 MG/DL — HIGH (ref 70–99)
GLUCOSE BLDC GLUCOMTR-MCNC: 280 MG/DL — HIGH (ref 70–99)
GLUCOSE SERPL-MCNC: 224 MG/DL — HIGH (ref 70–99)
GLUCOSE SERPL-MCNC: 276 MG/DL — HIGH (ref 70–99)
HCT VFR BLD CALC: 38.1 % — LOW (ref 39–50)
HGB BLD-MCNC: 12.5 G/DL — LOW (ref 13–17)
MAGNESIUM SERPL-MCNC: 1.8 MG/DL — SIGNIFICANT CHANGE UP (ref 1.6–2.6)
MAGNESIUM SERPL-MCNC: 2.1 MG/DL — SIGNIFICANT CHANGE UP (ref 1.6–2.6)
MCHC RBC-ENTMCNC: 22.5 PG — LOW (ref 27–34)
MCHC RBC-ENTMCNC: 32.8 GM/DL — SIGNIFICANT CHANGE UP (ref 32–36)
MCV RBC AUTO: 68.6 FL — LOW (ref 80–100)
MRSA PCR RESULT.: SIGNIFICANT CHANGE UP
NRBC # BLD: 0 /100 WBCS — SIGNIFICANT CHANGE UP (ref 0–0)
PHOSPHATE SERPL-MCNC: 2 MG/DL — LOW (ref 2.5–4.5)
PHOSPHATE SERPL-MCNC: 2.9 MG/DL — SIGNIFICANT CHANGE UP (ref 2.5–4.5)
PLATELET # BLD AUTO: 214 K/UL — SIGNIFICANT CHANGE UP (ref 150–400)
POTASSIUM SERPL-MCNC: 3.6 MMOL/L — SIGNIFICANT CHANGE UP (ref 3.5–5.3)
POTASSIUM SERPL-MCNC: 3.7 MMOL/L — SIGNIFICANT CHANGE UP (ref 3.5–5.3)
POTASSIUM SERPL-SCNC: 3.6 MMOL/L — SIGNIFICANT CHANGE UP (ref 3.5–5.3)
POTASSIUM SERPL-SCNC: 3.7 MMOL/L — SIGNIFICANT CHANGE UP (ref 3.5–5.3)
RBC # BLD: 5.55 M/UL — SIGNIFICANT CHANGE UP (ref 4.2–5.8)
RBC # FLD: 15.6 % — HIGH (ref 10.3–14.5)
S AUREUS DNA NOSE QL NAA+PROBE: SIGNIFICANT CHANGE UP
SODIUM SERPL-SCNC: 136 MMOL/L — SIGNIFICANT CHANGE UP (ref 135–145)
SODIUM SERPL-SCNC: 137 MMOL/L — SIGNIFICANT CHANGE UP (ref 135–145)
WBC # BLD: 4.99 K/UL — SIGNIFICANT CHANGE UP (ref 3.8–10.5)
WBC # FLD AUTO: 4.99 K/UL — SIGNIFICANT CHANGE UP (ref 3.8–10.5)

## 2020-07-07 RX ORDER — ISOPROPYL ALCOHOL, BENZOCAINE .7; .06 ML/ML; ML/ML
1 SWAB TOPICAL
Qty: 100 | Refills: 1
Start: 2020-07-07 | End: 2020-08-25

## 2020-07-07 RX ORDER — INSULIN LISPRO 100/ML
8 VIAL (ML) SUBCUTANEOUS
Refills: 0 | Status: DISCONTINUED | OUTPATIENT
Start: 2020-07-07 | End: 2020-07-08

## 2020-07-07 RX ORDER — ENOXAPARIN SODIUM 100 MG/ML
30 INJECTION SUBCUTANEOUS
Qty: 10 | Refills: 0
Start: 2020-07-07 | End: 2020-08-05

## 2020-07-07 RX ORDER — INSULIN GLARGINE 100 [IU]/ML
30 INJECTION, SOLUTION SUBCUTANEOUS AT BEDTIME
Refills: 0 | Status: DISCONTINUED | OUTPATIENT
Start: 2020-07-07 | End: 2020-07-08

## 2020-07-07 RX ORDER — SODIUM,POTASSIUM PHOSPHATES 278-250MG
1 POWDER IN PACKET (EA) ORAL THREE TIMES A DAY
Refills: 0 | Status: DISCONTINUED | OUTPATIENT
Start: 2020-07-07 | End: 2020-07-08

## 2020-07-07 RX ORDER — LEVOTHYROXINE SODIUM 125 MCG
1 TABLET ORAL
Qty: 30 | Refills: 0
Start: 2020-07-07 | End: 2020-08-05

## 2020-07-07 RX ORDER — METFORMIN HYDROCHLORIDE 850 MG/1
1 TABLET ORAL
Qty: 60 | Refills: 0
Start: 2020-07-07 | End: 2020-08-05

## 2020-07-07 RX ORDER — METFORMIN HYDROCHLORIDE 850 MG/1
500 TABLET ORAL
Refills: 0 | Status: DISCONTINUED | OUTPATIENT
Start: 2020-07-07 | End: 2020-07-08

## 2020-07-07 RX ORDER — INSULIN GLARGINE 100 [IU]/ML
25 INJECTION, SOLUTION SUBCUTANEOUS AT BEDTIME
Refills: 0 | Status: DISCONTINUED | OUTPATIENT
Start: 2020-07-07 | End: 2020-07-07

## 2020-07-07 RX ORDER — POTASSIUM PHOSPHATE, MONOBASIC POTASSIUM PHOSPHATE, DIBASIC 236; 224 MG/ML; MG/ML
15 INJECTION, SOLUTION INTRAVENOUS ONCE
Refills: 0 | Status: COMPLETED | OUTPATIENT
Start: 2020-07-07 | End: 2020-07-07

## 2020-07-07 RX ORDER — MAGNESIUM SULFATE 500 MG/ML
1 VIAL (ML) INJECTION ONCE
Refills: 0 | Status: COMPLETED | OUTPATIENT
Start: 2020-07-07 | End: 2020-07-07

## 2020-07-07 RX ORDER — ATORVASTATIN CALCIUM 80 MG/1
1 TABLET, FILM COATED ORAL
Qty: 30 | Refills: 0
Start: 2020-07-07 | End: 2020-08-05

## 2020-07-07 RX ADMIN — Medication 4 UNIT(S): at 08:04

## 2020-07-07 RX ADMIN — Medication 1 TABLET(S): at 14:29

## 2020-07-07 RX ADMIN — Medication 3: at 08:03

## 2020-07-07 RX ADMIN — Medication 2: at 21:26

## 2020-07-07 RX ADMIN — Medication 8 UNIT(S): at 17:09

## 2020-07-07 RX ADMIN — Medication 2: at 17:09

## 2020-07-07 RX ADMIN — CHLORHEXIDINE GLUCONATE 1 APPLICATION(S): 213 SOLUTION TOPICAL at 05:41

## 2020-07-07 RX ADMIN — Medication 100 GRAM(S): at 05:41

## 2020-07-07 RX ADMIN — Medication 1 TABLET(S): at 05:41

## 2020-07-07 RX ADMIN — Medication 50 MICROGRAM(S): at 05:41

## 2020-07-07 RX ADMIN — INSULIN GLARGINE 30 UNIT(S): 100 INJECTION, SOLUTION SUBCUTANEOUS at 21:26

## 2020-07-07 RX ADMIN — Medication 3: at 11:56

## 2020-07-07 RX ADMIN — Medication 1 TABLET(S): at 21:26

## 2020-07-07 RX ADMIN — PANTOPRAZOLE SODIUM 40 MILLIGRAM(S): 20 TABLET, DELAYED RELEASE ORAL at 11:44

## 2020-07-07 RX ADMIN — METFORMIN HYDROCHLORIDE 500 MILLIGRAM(S): 850 TABLET ORAL at 17:08

## 2020-07-07 RX ADMIN — ATORVASTATIN CALCIUM 40 MILLIGRAM(S): 80 TABLET, FILM COATED ORAL at 21:26

## 2020-07-07 RX ADMIN — Medication 8 UNIT(S): at 11:56

## 2020-07-07 RX ADMIN — ENOXAPARIN SODIUM 40 MILLIGRAM(S): 100 INJECTION SUBCUTANEOUS at 11:45

## 2020-07-07 RX ADMIN — POTASSIUM PHOSPHATE, MONOBASIC POTASSIUM PHOSPHATE, DIBASIC 62.5 MILLIMOLE(S): 236; 224 INJECTION, SOLUTION INTRAVENOUS at 02:34

## 2020-07-07 NOTE — DIETITIAN INITIAL EVALUATION ADULT. - OTHER INFO
PT visited. Pt seen for Diet education New Onset DM. Pt DG from ICU to 4 N on 7/7. Pt  Reports Good appetite. Po tolerated. Weight stable. Ht 6 feet 2 inches, Wt 260 lb. Endo consult Noted.  Diabetic Education provided on My plate and Food labeling , Hypoglycemia  and Nutrition Therapy on Type 1 DM. D/W RN Insulin teaching provided by RN. Pt receptive Diet education. Pt admitted W DKA, NEW DM.

## 2020-07-07 NOTE — PROGRESS NOTE ADULT - PROBLEM SELECTOR PLAN 2
Has h/o hypothyroidism, also has FH   has not been taking medication for past couple years, non compliant  Given constipation and overweight, it is likely that his hypothyroidism is not under control
Has h/o hypothyroidism, also has FH   has not been taking medication for past couple years, non compliant  Given constipation and overweight, it is likely that his hypothyroidism is not under control
Phosphorus 2.0  PO Potassium phosphate ordered  F/u phosphorus level in AM

## 2020-07-07 NOTE — DISCHARGE NOTE PROVIDER - HOSPITAL COURSE
20 year-old  overweight male, with medical history of hypothyroidism not on medication, presents with cc constipation x 4 days & nausea x 2 days. In ED pt was found to have blood sugar in the 300's, Ketones in the urine & AIC of 11.5 . Pt was bolus with 3L of IVF. BS came down to the 230's with closure of anion gap. Was given 5U of regular insulin in ED & was started on sliding scale q6, with finger stick q6. Ct abdomen showed fatty liver and hepatosis. He is being admitted for evaluation of new onset diabetes & constipation. He was in the ICU for his blood glucose management & when his blood sugar was stable he was down graded to 4North            # Diabetic ketoacidosis without coma associated with type 2 diabetes mellitus.  On admission patient was found to have BS elevated to 300s, ketones in urine, AG of 20, VBG showed acidosis, low bicarb. He was given 3 L bolus in Ed after which blood sugars came down to 230s with closure of anion gap. DKA resolved. Endocrinologist, Dr. Curtis was consulted and pt was placed on ISS at mealtimes & on Lantus at bedtime.         # Constipation.  Plan: Complains of no BM for past 4 days    [facility]. s/p miralax, & dulcoloax in ED & was then placed on bowel regime.            # Hypothyroidism: Has h/o hypothyroidism, also has FH     has not been taking medication for past couple years, non compliant    Given constipation and overweight, it is likely that his hypothyroidism is not under control    Endocrin was consulted & TSH was being monitored.                 # Obesity: Pt's s obese with BMI of 36. Could be partly due to untreated hypothyroidism    Diabetic education and nutritional consult was provided.                >>>>>>>>>>>>>>>>>>>>>>>>>>>>>>>>>>>INCOMPLETE>>>>>>>>>>>>>>>>>>>>>>>>>> 20 year old  overweight male, with PMH of hypothyroidism not on medication, presents with constipation and nausea. Pt was found to have blood sugar in the 300's, ketones in the urine with A1C of 11.5. Blood glucose improved to the 230's with closure of anion gap. Was given 5U of regular insulin in ED & was started on sliding scale q6, with finger stick q6. CT abdomen showed fatty liver and hepatosis. Pt admitted for new onset diabetes. Endocrine consulted. Pt was in ICU for blood glucose management. Pt found tachycardic in ICU, Cardiology consulted. Pt was transferred to  on 7/6 with improvement in blood sugars. Diabetic and nutrition teaching was provided. Pt still with blood sugars in the 200s, insulin regimen was adjusted. Pt is clinically improving and medically optimized for discharge. 20 year old  overweight male, with PMH of hypothyroidism not on medication, presents with constipation and nausea. Pt was found to have blood sugar in the 300's, ketones in the urine with A1C of 11.5. Blood glucose improved to the 230's with closure of anion gap. Was given 5U of regular insulin in ED & was started on sliding scale q6, with finger stick q6. CT abdomen showed fatty liver and hepatosis. Pt admitted for new onset diabetes. Endocrine consulted. Pt was in ICU for DKA, since resolved. Pt found tachycardic in ICU, Cardiology was consulted. Pt was transferred to  on 7/6 with improvement in blood sugars. Diabetic and nutrition teaching was provided. Pt still with blood sugars in the 200s, insulin regimen was adjusted. Pt is clinically improving and medically optimized for discharge.

## 2020-07-07 NOTE — PROGRESS NOTE ADULT - SUBJECTIVE AND OBJECTIVE BOX
Interval Events:  pt in nad    Allergies    No Known Allergies    Intolerances      Endocrine/Metabolic Medications:  atorvastatin 40 milliGRAM(s) Oral at bedtime  dextrose 50% Injectable 12.5 Gram(s) IV Push once  insulin glargine Injectable (LANTUS) 30 Unit(s) SubCutaneous at bedtime  insulin lispro (HumaLOG) corrective regimen sliding scale   SubCutaneous Before meals and at bedtime  insulin lispro Injectable (HumaLOG) 8 Unit(s) SubCutaneous three times a day before meals  levothyroxine 50 MICROGram(s) Oral daily  metFORMIN 500 milliGRAM(s) Oral two times a day      Vital Signs Last 24 Hrs  T(C): 36.6 (07 Jul 2020 14:28), Max: 36.7 (06 Jul 2020 21:00)  T(F): 97.8 (07 Jul 2020 14:28), Max: 98.1 (07 Jul 2020 00:00)  HR: 100 (07 Jul 2020 14:28) (76 - 107)  BP: 115/66 (07 Jul 2020 14:28) (115/66 - 135/73)  BP(mean): 88 (07 Jul 2020 04:43) (71 - 89)  RR: 18 (07 Jul 2020 14:28) (17 - 26)  SpO2: 100% (07 Jul 2020 14:28) (97% - 100%)      PHYSICAL EXAM  All physical exam findings normal, except those marked:  General:	Alert, active, cooperative, NAD, well hydrated  .		[] Abnormal:  Neck		Normal: supple, no cervical adenopathy, no palpable thyroid  .		[] Abnormal:  Cardiovascular	Normal: regular rate, normal S1, S2, no murmurs  .		[] Abnormal:  Respiratory	Normal: no chest wall deformity, normal respiratory pattern, CTA B/L  .		[] Abnormal:  Abdominal	Normal: soft, ND, NT, bowel sounds present, no masses, no organomegaly  .		[] Abnormal:  		Normal normal genitalia, testes descended, circumcised/uncircumcised  .		Loraine stage:			Breast loraine:  .		Menstrual history:  .		[] Abnormal:  Extremities	Normal: FROM x4  .		[] Abnormal:  Skin		Normal: intact and not indurated, no rash, no acanthosis nigricans  .		[] Abnormal:  Neurologic	Normal: grossly intact  .		[] Abnormal:    LABS                        12.5   4.99  )-----------( 214      ( 07 Jul 2020 07:16 )             38.1                               136    |  103    |  6                   Calcium: 8.4   / iCa: x      (07-07 @ 07:16)    ----------------------------<  276       Magnesium: 2.1                              3.6     |  21     |  0.74             Phosphorous: 2.9        CAPILLARY BLOOD GLUCOSE      POCT Blood Glucose.: 242 mg/dL (07 Jul 2020 16:46)  POCT Blood Glucose.: 280 mg/dL (07 Jul 2020 11:49)  POCT Blood Glucose.: 271 mg/dL (07 Jul 2020 07:37)  POCT Blood Glucose.: 229 mg/dL (06 Jul 2020 22:32)        Assesment/plan       Problem/Recommendation - 1:   Diabetic ketoacidosis without coma associated with type 2 diabetes mellitus. -DKA due to un controlled new onset DM  no nausea/ vomiting  tolerating diet  change to lantus 30 units qhs  humalog to 8 units ac tid  add metformin 500 bid  dm teaching  nutrition consult.     Problem/Recommendation - 2:   Hypothyroid. : agree with lt4   change to 50 mcg qd  compliance d/w pt.

## 2020-07-07 NOTE — CONSULT NOTE ADULT - SUBJECTIVE AND OBJECTIVE BOX
Time of visit:    CHIEF COMPLAINT: Patient is a 20y old  Male who presents with a chief complaint of Constipation (07 Jul 2020 14:37)      HPI:  19 yo male with medical history significant for hypothyroidism(not on medication) comes in with constipation for past 4 days. He hasn't had a BM for past 4 days and was seen in UC yesterday. He was diagnosed with hemorrhoids and anal fissure, was prescribed lidocaine cream and advised for sitz bath. Also reports nausea and 1-2 episodes of NBNB vomiting after eating every day for 3 days. In ED he was found to have elevated blood sugars with anion gap and ketones in urine. He does not have history of diabetes. No history of abdominal pain, fever, headache, chest pain, urinary symptoms, increased thirst, increased frequency of urination. No h/o recent illness, trauma, sick contacts. (05 Jul 2020 01:27)   Patient seen and examined.     PAST MEDICAL & SURGICAL HISTORY:  Hypothyroid  No significant past surgical history      Allergies    No Known Allergies    Intolerances        MEDICATIONS  (STANDING):  atorvastatin 40 milliGRAM(s) Oral at bedtime  chlorhexidine 2% Cloths 1 Application(s) Topical <User Schedule>  dextrose 50% Injectable 12.5 Gram(s) IV Push once  enoxaparin Injectable 40 milliGRAM(s) SubCutaneous daily  insulin glargine Injectable (LANTUS) 30 Unit(s) SubCutaneous at bedtime  insulin lispro (HumaLOG) corrective regimen sliding scale   SubCutaneous Before meals and at bedtime  insulin lispro Injectable (HumaLOG) 8 Unit(s) SubCutaneous three times a day before meals  levothyroxine 50 MICROGram(s) Oral daily  metFORMIN 500 milliGRAM(s) Oral two times a day  pantoprazole  Injectable 40 milliGRAM(s) IV Push daily  potassium phosphate / sodium phosphate Tablet (K-PHOS No. 2) 1 Tablet(s) Oral three times a day      MEDICATIONS  (PRN):  bisacodyl 5 milliGRAM(s) Oral every 12 hours PRN Constipation   Medications up to date at time of exam.    Medications up to date at time of exam.    FAMILY HISTORY:  FH: hypothyroidism      SOCIAL HISTORY  Smoking History: [ x  ] smoking/smoke exposure, Hooka pipe  Living Condition: [   ] apartment, [   ] private house  Work History:   Travel History: denies recent travel  Illicit Substance Use: denies  Alcohol Use: denies    REVIEW OF SYSTEMS:    CONSTITUTIONAL:  denies fevers, chills, sweats, weight loss    HEENT:  denies diplopia or blurred vision, sore throat or runny nose.    CARDIOVASCULAR:  denies pressure, squeezing, tightness, or heaviness about the chest; no palpitations.    RESPIRATORY:  denies SOB, cough, BENNETT, wheezing.    GASTROINTESTINAL:  denies abdominal pain, nausea, vomiting or diarrhea.    GENITOURINARY: denies dysuria, frequency or urgency.    NEUROLOGIC:  denies numbness, tingling, seizures or weakness.    PSYCHIATRIC:  denies disorder of thought or mood.    MSK: denies swelling, redness      PHYSICAL EXAMINATION:    GENERAL: The patient is a well-developed, well-nourished, in no apparent distress.     Vital Signs Last 24 Hrs  T(C): 36.6 (07 Jul 2020 14:28), Max: 36.7 (06 Jul 2020 21:00)  T(F): 97.8 (07 Jul 2020 14:28), Max: 98.1 (07 Jul 2020 00:00)  HR: 100 (07 Jul 2020 14:28) (76 - 107)  BP: 115/66 (07 Jul 2020 14:28) (115/66 - 135/73)  BP(mean): 88 (07 Jul 2020 04:43) (71 - 89)  RR: 18 (07 Jul 2020 14:28) (13 - 26)  SpO2: 100% (07 Jul 2020 14:28) (97% - 100%)   (if applicable)    Chest Tube (if applicable)    HEENT: Head is normocephalic and atraumatic. Extraocular muscles are intact. Mucous membranes are moist.     NECK: Supple, no palpable adenopathy.    LUNGS: Clear to auscultation, no wheezing, rales, or rhonchi.    HEART: Regular rate and rhythm without murmur.    ABDOMEN: Soft, nontender, and nondistended.  No hepatosplenomegaly is noted.    RENAL: No difficulty voiding, no pelvic pain    EXTREMITIES: Without any cyanosis, clubbing, rash, lesions or edema.    NEUROLOGIC: Awake, alert, oriented, grossly intact    SKIN: Warm, dry, good turgor.      LABS:                        12.5   4.99  )-----------( 214      ( 07 Jul 2020 07:16 )             38.1     07-07    136  |  103  |  6<L>  ----------------------------<  276<H>  3.6   |  21<L>  |  0.74    Ca    8.4      07 Jul 2020 07:16  Phos  2.9     07-07  Mg     2.1     07-07    TPro  7.5  /  Alb  3.6  /  TBili  0.6  /  DBili  x   /  AST  44<H>  /  ALT  77<H>  /  AlkPhos  87  07-06                        MICROBIOLOGY: (if applicable)    RADIOLOGY & ADDITIONAL STUDIES:  EKG:   CXR:< from: CT Abdomen and Pelvis w/ IV Cont (07.04.20 @ 19:45) >    EXAM:  CT ABDOMEN AND PELVIS IC                            PROCEDURE DATE:  07/04/2020          INTERPRETATION:  CLINICAL INFORMATION: Right lower quadrant tenderness, rule out appendicitis.    COMPARISON: None.    PROCEDURE:   CT of the Abdomen and Pelvis was performed with intravenous contrast.   Intravenous contrast: 90 ml Omnipaque 350. 10 ml discarded.  Oral contrast: None.  Sagittal and coronal reformats were performed.    FINDINGS:  LOWER CHEST: Within normal limits.    LIVER: Steatosis.Enlarged, measuring up to 20 cm in length.  BILE DUCTS: Normal caliber.  GALLBLADDER: Within normal limits.  SPLEEN: Within normal limits.  PANCREAS: Within normal limits.  ADRENALS: Within normal limits.  KIDNEYS/URETERS: Within normal limits.    BLADDER: Within normal limits.  REPRODUCTIVE ORGANS: Prostate within normal limits.    BOWEL: No bowel obstruction. Appendix is normal.  PERITONEUM: No ascites.  VESSELS: Within normal limits.  RETROPERITONEUM/LYMPH NODES: No lymphadenopathy.    ABDOMINAL WALL: Within normal limits.  BONES: Within normal limits.    IMPRESSION:   Normal appendix.  Hepatic steatosis and hepatomegaly.                    NAVA BERNAL M.D., ATTENDING RADIOLOGIST  This document has been electronically signed. Jul 4 2020  8:06PM                < end of copied text >    ECHO:    IMPRESSION: 20y Male PAST MEDICAL & SURGICAL HISTORY:  Hypothyroid  No significant past surgical history   p/w               IMP: This is a 20 year-old  overweight male, with hypothyroidism, smoker  not on medication, presents with cc constipation x 4 days and vomiting for past 2 days, NBNB.  Patient was admitted to medicine initially but after enough fluid resuscitation acidosis did not resolve. Patient is being transferred to ICU for new onset DKA and life threatening metabolic acidosis. Clinically improved. tachycardia and SOB probable due to severe metabolic acidosis and hypovolemia     Diagnosis  -  DKA  -  New onset DM  -  Metabolic acidosis severe life threatening   -  Hypothyroid   -  Smoker  ( hooka pipe)  -  Dyslipidemia      Sugg;  -continue meds  -insulin and metfrmin  -advise to stop smoking / vaping  -wt loss/ diet exercise

## 2020-07-07 NOTE — PROGRESS NOTE ADULT - SUBJECTIVE AND OBJECTIVE BOX
DATE OF SERVICE:Patient was seen and examined :07/07/2020    PRESENTING CC:DKA    SUBJ: 19 yo male with medical history significant for hypothyroidism(not on medication) comes in with constipation for past 4 days. Admitted to ICU with DKA,noted to be tachycardic no hx syncope chest pain or dyspnea-Pt was found to have blood sugar in the 300's, ketones in the urine with A1C of 11.5. Pt found tachycardic in ICU      PMH -reviewed admission note, no change since admission  Heart failure: acute [ ] chronic [ ] acute or chronic [ ] diastolic [ ] systolic [ ] combined systolic and diastolic[ ]  ANETA: ATN[ ] renal medullary necrosis [ ] CKD I [ ]CKDII [ ]CKD III [ ]CKD IV [ ]CKD V [ ]Other pathological lesions [ ]    MEDICATIONS  (STANDING):  atorvastatin 40 milliGRAM(s) Oral at bedtime  chlorhexidine 2% Cloths 1 Application(s) Topical <User Schedule>  dextrose 50% Injectable 12.5 Gram(s) IV Push once  enoxaparin Injectable 40 milliGRAM(s) SubCutaneous daily  insulin glargine Injectable (LANTUS) 30 Unit(s) SubCutaneous at bedtime  insulin lispro (HumaLOG) corrective regimen sliding scale   SubCutaneous Before meals and at bedtime  insulin lispro Injectable (HumaLOG) 8 Unit(s) SubCutaneous three times a day before meals  levothyroxine 50 MICROGram(s) Oral daily  metFORMIN 500 milliGRAM(s) Oral two times a day  pantoprazole  Injectable 40 milliGRAM(s) IV Push daily  potassium phosphate / sodium phosphate Tablet (K-PHOS No. 2) 1 Tablet(s) Oral three times a day    REVIEW OF SYSTEMS:  Constitutional: [ ] fever, [ ]weight loss,  [x ]fatigue  Eyes: [ ] visual changes  Respiratory: [ ]shortness of breath;  [ ] cough, [ ]wheezing, [ ]chills, [ ]hemoptysis  Cardiovascular: [ ] chest pain, [x ]palpitations, [ ]dizziness,  [ ]leg swelling[ ]orthopnea[ ]PND  Gastrointestinal: [ ] abdominal pain, [ ]nausea, [ ]vomiting,  [ ]diarrhea   Genitourinary: [ ] dysuria, [ ] hematuria  Neurologic: [ ] headaches [ ] tremors[ ]weakness  Skin: [ ] itching, [ ]burning, [ ] rashes  Endocrine: [ ] heat or cold intolerance  Musculoskeletal: [ ] joint pain or swelling; [ ] muscle, back, or extremity pain  Psychiatric: [ ] depression, [ ]anxiety, [ ]mood swings, or [ ]difficulty sleeping  Hematologic: [ ] easy bruising, [ ] bleeding gums    [x] All remaining systems negative except as per above.   [ ]Unable to obtain.    Vital Signs Last 24 Hrs  T(C): 36.6 (07 Jul 2020 14:28), Max: 36.7 (06 Jul 2020 21:00)  T(F): 97.8 (07 Jul 2020 14:28), Max: 98.1 (07 Jul 2020 00:00)  HR: 100 (07 Jul 2020 14:28) (71 - 107)  BP: 115/66 (07 Jul 2020 14:28) (115/66 - 135/73)  BP(mean): 88 (07 Jul 2020 04:43) (71 - 89)  RR: 18 (07 Jul 2020 14:28) (13 - 26)  SpO2: 100% (07 Jul 2020 14:28) (97% - 100%)    PHYSICAL EXAM:  General: No acute distress BMI-33  HEENT: EOMI, PERRL  Neck: Supple, [ ] JVD  Lungs: Equal air entry bilaterally; [ ] rales [ ] wheezing [ ] rhonchi  Heart: Regular rate and rhythm; [x ] murmur   2/6 [x ] systolic [ ] diastolic [ ] radiation[ ] rubs [ ]  gallops  Abdomen: Nontender, bowel sounds present  Extremities: No clubbing, cyanosis, [ ] edema  Nervous system:  Alert & Oriented X3, no focal deficits  Psychiatric: Normal affect  Skin: No rashes or lesions    LABS:  12.5   4.99  )-----------( 214      ( 07 Jul 2020 07:16 )             38.1     07-07    136  |  103  |  6<L>  ----------------------------<  276<H>  3.6   |  21<L>  |  0.74    Ca    8.4      07 Jul 2020 07:16  Phos  2.9     07-07  Mg     2.1     07-07    TPro  7.5  /  Alb  3.6  /  TBili  0.6  /  DBili  x   /  AST  44<H>  /  ALT  77<H>  /  AlkPhos  87  07-06CAPILLARY BLOOD GLUCOSE      POCT Blood Glucose.: 280 mg/dL (07 Jul 2020 11:49)  POCT Blood Glucose.: 271 mg/dL (07 Jul 2020 07:37)  POCT Blood Glucose.: 229 mg/dL (06 Jul 2020 22:32)  POCT Blood Glucose.: 273 mg/dL (06 Jul 2020 19:30)  POCT Blood Glucose.: 214 mg/dL (06 Jul 2020 17:23)      IMPRESSION AND PLAN:      Problem/Plan - 1:  ·  Problem: Uncontrolled diabetes mellitus.  Plan: Pt with new onset DM  Pt was admitted to ICU due to DKA, now resolved  Hgb A1c 11.5   Lantus increased to 30 units HS and pre-meal Humalog to 8 units         Problem/Plan - 2:  ·  Problem: Hypophosphatemia.  Plan: Phosphorus 2.0  PO Potassium phosphate ordered    Problem/Plan - 3:  ·  Problem: Tachycardia Plan:Resolved  Likely 2/2 dehydration   No evidence for heart failure

## 2020-07-07 NOTE — DISCHARGE NOTE PROVIDER - NSDCMRMEDTOKEN_GEN_ALL_CORE_FT
alcohol swabs : Apply topically to affected area 4 times a day   atorvastatin 40 mg oral tablet: 1 tab(s) orally once a day (at bedtime)  bisacodyl 5 mg oral delayed release tablet: 1 tab(s) orally every 12 hours, As needed, Constipation  glucometer (per patient&#x27;s insurance): Test blood sugars four times a day. Dispense #1 glucometer.  Insulin Pen Needles, 4mm: 1 application subcutaneously 4 times a day. ** Use with insulin pen **   lancets: 1 application subcutaneously 4 times a day   Lantus Solostar Pen 100 units/mL subcutaneous solution: 30 unit(s) subcutaneous once a day (at bedtime)   levothyroxine 50 mcg (0.05 mg) oral tablet: 1 tab(s) orally once a day  metFORMIN 500 mg oral tablet: 1 tab(s) orally 2 times a day  test strips (per patient&#x27;s insurance): 1 application subcutaneously 4 times a day. ** Compatible with patient&#x27;s glucometer **

## 2020-07-07 NOTE — PROGRESS NOTE ADULT - SUBJECTIVE AND OBJECTIVE BOX
20 year old  overweight male, with PMH of hypothyroidism not on medication, presents with constipation and nausea. Pt was found to have blood sugar in the 300's, ketones in the urine with A1C of 11.5. BS improved to the 230's with closure of anion gap. Was given 5U of regular insulin in ED & was started on sliding scale q6, with finger stick q6. CT abdomen showed fatty liver and hepatosis. Pt admitted for new onset diabetes. Endocrine consulted. Pt was in ICU for blood glucose management and was transferred to  with improvement in blood sugars. Diabetic and nutrition teaching were given. Pt still with blood sugars in the 200s, insulin regimen adjusted.     INTERVAL HPI/OVERNIGHT EVENTS: Seen at bedside, no new complaints    MEDICATIONS  (STANDING):  atorvastatin 40 milliGRAM(s) Oral at bedtime  chlorhexidine 2% Cloths 1 Application(s) Topical <User Schedule>  dextrose 50% Injectable 12.5 Gram(s) IV Push once  enoxaparin Injectable 40 milliGRAM(s) SubCutaneous daily  insulin glargine Injectable (LANTUS) 30 Unit(s) SubCutaneous at bedtime  insulin lispro (HumaLOG) corrective regimen sliding scale   SubCutaneous Before meals and at bedtime  insulin lispro Injectable (HumaLOG) 8 Unit(s) SubCutaneous three times a day before meals  levothyroxine 50 MICROGram(s) Oral daily  metFORMIN 500 milliGRAM(s) Oral two times a day  pantoprazole  Injectable 40 milliGRAM(s) IV Push daily  potassium phosphate / sodium phosphate Tablet (K-PHOS No. 2) 1 Tablet(s) Oral three times a day    MEDICATIONS  (PRN):  bisacodyl 5 milliGRAM(s) Oral every 12 hours PRN Constipation      __________________________________________________  REVIEW OF SYSTEMS:    CONSTITUTIONAL: No fever,   EYES: no acute visual disturbances  NECK: No pain or stiffness  RESPIRATORY: No cough; No shortness of breath  CARDIOVASCULAR: No chest pain, no palpitations  GASTROINTESTINAL: No pain. No nausea or vomiting; No diarrhea   NEUROLOGICAL: No headache or numbness, no tremors  MUSCULOSKELETAL: No joint pain, no muscle pain  GENITOURINARY: no dysuria, no frequency, no hesitancy  PSYCHIATRY: no depression , no anxiety  ALL OTHER  ROS negative        Vital Signs Last 24 Hrs  T(C): 36.6 (07 Jul 2020 14:28), Max: 36.7 (06 Jul 2020 21:00)  T(F): 97.8 (07 Jul 2020 14:28), Max: 98.1 (07 Jul 2020 00:00)  HR: 100 (07 Jul 2020 14:28) (71 - 107)  BP: 115/66 (07 Jul 2020 14:28) (115/66 - 135/73)  BP(mean): 88 (07 Jul 2020 04:43) (71 - 89)  RR: 18 (07 Jul 2020 14:28) (13 - 26)  SpO2: 100% (07 Jul 2020 14:28) (97% - 100%)    ________________________________________________  PHYSICAL EXAM:  GENERAL: NAD  HEENT: Normocephalic;  conjunctivae and sclerae clear; moist mucous membranes;   NECK : supple  CHEST/LUNG: Clear to auscultation bilaterally with good air entry   HEART: S1 S2  regular; no murmurs, gallops or rubs  ABDOMEN: Soft, Nontender, Nondistended; Bowel sounds present  EXTREMITIES: no cyanosis; no edema; no calf tenderness  SKIN: warm and dry; no rash  NERVOUS SYSTEM:  Awake and alert; Oriented  to place, person and time ; no new deficits    _________________________________________________  LABS:                        12.5   4.99  )-----------( 214      ( 07 Jul 2020 07:16 )             38.1     07-07    136  |  103  |  6<L>  ----------------------------<  276<H>  3.6   |  21<L>  |  0.74    Ca    8.4      07 Jul 2020 07:16  Phos  2.9     07-07  Mg     2.1     07-07    TPro  7.5  /  Alb  3.6  /  TBili  0.6  /  DBili  x   /  AST  44<H>  /  ALT  77<H>  /  AlkPhos  87  07-06        CAPILLARY BLOOD GLUCOSE      POCT Blood Glucose.: 280 mg/dL (07 Jul 2020 11:49)  POCT Blood Glucose.: 271 mg/dL (07 Jul 2020 07:37)  POCT Blood Glucose.: 229 mg/dL (06 Jul 2020 22:32)  POCT Blood Glucose.: 273 mg/dL (06 Jul 2020 19:30)  POCT Blood Glucose.: 214 mg/dL (06 Jul 2020 17:23)  POCT Blood Glucose.: 247 mg/dL (06 Jul 2020 16:23)  POCT Blood Glucose.: 227 mg/dL (06 Jul 2020 15:31)        RADIOLOGY & ADDITIONAL TESTS:    Imaging  Reviewed:  YES    Consultant(s) Notes Reviewed:   YES      Plan of care was discussed with patient and /or primary care giver; all questions and concerns were addressed 20 year old  overweight male, with PMH of hypothyroidism not on medication, presents with constipation and nausea. Pt was found to have blood sugar in the 300's, ketones in the urine with A1C of 11.5. BS improved to the 230's with closure of anion gap. Was given 5U of regular insulin in ED & was started on sliding scale q6, with finger stick q6. CT abdomen showed fatty liver and hepatosis. Pt admitted for new onset diabetes. Endocrine consulted. Pt was in ICU for blood glucose management and was transferred to  with improvement in blood sugars. Diabetic and nutrition teaching started. Pt still with blood sugars in the 200s, insulin regimen adjusted.     INTERVAL HPI/OVERNIGHT EVENTS: Seen at bedside, no new complaints    MEDICATIONS  (STANDING):  atorvastatin 40 milliGRAM(s) Oral at bedtime  chlorhexidine 2% Cloths 1 Application(s) Topical <User Schedule>  dextrose 50% Injectable 12.5 Gram(s) IV Push once  enoxaparin Injectable 40 milliGRAM(s) SubCutaneous daily  insulin glargine Injectable (LANTUS) 30 Unit(s) SubCutaneous at bedtime  insulin lispro (HumaLOG) corrective regimen sliding scale   SubCutaneous Before meals and at bedtime  insulin lispro Injectable (HumaLOG) 8 Unit(s) SubCutaneous three times a day before meals  levothyroxine 50 MICROGram(s) Oral daily  metFORMIN 500 milliGRAM(s) Oral two times a day  pantoprazole  Injectable 40 milliGRAM(s) IV Push daily  potassium phosphate / sodium phosphate Tablet (K-PHOS No. 2) 1 Tablet(s) Oral three times a day    MEDICATIONS  (PRN):  bisacodyl 5 milliGRAM(s) Oral every 12 hours PRN Constipation      __________________________________________________  REVIEW OF SYSTEMS:    CONSTITUTIONAL: No fever,   EYES: no acute visual disturbances  NECK: No pain or stiffness  RESPIRATORY: No cough; No shortness of breath  CARDIOVASCULAR: No chest pain, no palpitations  GASTROINTESTINAL: No pain. No nausea or vomiting; No diarrhea   NEUROLOGICAL: No headache or numbness, no tremors  MUSCULOSKELETAL: No joint pain, no muscle pain  GENITOURINARY: no dysuria, no frequency, no hesitancy  PSYCHIATRY: no depression , no anxiety  ALL OTHER  ROS negative        Vital Signs Last 24 Hrs  T(C): 36.6 (07 Jul 2020 14:28), Max: 36.7 (06 Jul 2020 21:00)  T(F): 97.8 (07 Jul 2020 14:28), Max: 98.1 (07 Jul 2020 00:00)  HR: 100 (07 Jul 2020 14:28) (71 - 107)  BP: 115/66 (07 Jul 2020 14:28) (115/66 - 135/73)  BP(mean): 88 (07 Jul 2020 04:43) (71 - 89)  RR: 18 (07 Jul 2020 14:28) (13 - 26)  SpO2: 100% (07 Jul 2020 14:28) (97% - 100%)    ________________________________________________  PHYSICAL EXAM:  GENERAL: NAD  HEENT: Normocephalic;  conjunctivae and sclerae clear; moist mucous membranes;   NECK : supple  CHEST/LUNG: Clear to auscultation bilaterally with good air entry   HEART: S1 S2  regular; no murmurs, gallops or rubs  ABDOMEN: Soft, Nontender, Nondistended; Bowel sounds present  EXTREMITIES: no cyanosis; no edema; no calf tenderness  SKIN: warm and dry; no rash  NERVOUS SYSTEM:  Awake and alert; Oriented  to place, person and time ; no new deficits    _________________________________________________  LABS:                        12.5   4.99  )-----------( 214      ( 07 Jul 2020 07:16 )             38.1     07-07    136  |  103  |  6<L>  ----------------------------<  276<H>  3.6   |  21<L>  |  0.74    Ca    8.4      07 Jul 2020 07:16  Phos  2.9     07-07  Mg     2.1     07-07    TPro  7.5  /  Alb  3.6  /  TBili  0.6  /  DBili  x   /  AST  44<H>  /  ALT  77<H>  /  AlkPhos  87  07-06        CAPILLARY BLOOD GLUCOSE      POCT Blood Glucose.: 280 mg/dL (07 Jul 2020 11:49)  POCT Blood Glucose.: 271 mg/dL (07 Jul 2020 07:37)  POCT Blood Glucose.: 229 mg/dL (06 Jul 2020 22:32)  POCT Blood Glucose.: 273 mg/dL (06 Jul 2020 19:30)  POCT Blood Glucose.: 214 mg/dL (06 Jul 2020 17:23)  POCT Blood Glucose.: 247 mg/dL (06 Jul 2020 16:23)  POCT Blood Glucose.: 227 mg/dL (06 Jul 2020 15:31)        RADIOLOGY & ADDITIONAL TESTS:    Imaging  Reviewed:  YES    Consultant(s) Notes Reviewed:   YES      Plan of care was discussed with patient and /or primary care giver; all questions and concerns were addressed 20 year old  overweight male, with PMH of hypothyroidism not on medication, presents with constipation and nausea. Pt was found to have blood sugar in the 300's, ketones in the urine with A1C of 11.5. BS improved to the 230's with closure of anion gap. Was given 5U of regular insulin in ED & was started on sliding scale q6, with finger stick q6. CT abdomen showed fatty liver and hepatosis. Pt admitted for new onset diabetes. Endocrine consulted. Pt was in ICU for blood glucose management. Pt found tachycardic in ICU, Cardiology consulted. Pt was transferred to  on 4/6 with improvement in blood sugars. Diabetic and nutrition teaching started. Pt still with blood sugars in the 200s, insulin regimen adjusted.     INTERVAL HPI/OVERNIGHT EVENTS: Seen at bedside, no new complaints    MEDICATIONS  (STANDING):  atorvastatin 40 milliGRAM(s) Oral at bedtime  chlorhexidine 2% Cloths 1 Application(s) Topical <User Schedule>  dextrose 50% Injectable 12.5 Gram(s) IV Push once  enoxaparin Injectable 40 milliGRAM(s) SubCutaneous daily  insulin glargine Injectable (LANTUS) 30 Unit(s) SubCutaneous at bedtime  insulin lispro (HumaLOG) corrective regimen sliding scale   SubCutaneous Before meals and at bedtime  insulin lispro Injectable (HumaLOG) 8 Unit(s) SubCutaneous three times a day before meals  levothyroxine 50 MICROGram(s) Oral daily  metFORMIN 500 milliGRAM(s) Oral two times a day  pantoprazole  Injectable 40 milliGRAM(s) IV Push daily  potassium phosphate / sodium phosphate Tablet (K-PHOS No. 2) 1 Tablet(s) Oral three times a day    MEDICATIONS  (PRN):  bisacodyl 5 milliGRAM(s) Oral every 12 hours PRN Constipation      __________________________________________________  REVIEW OF SYSTEMS:    CONSTITUTIONAL: No fever,   EYES: no acute visual disturbances  NECK: No pain or stiffness  RESPIRATORY: No cough; No shortness of breath  CARDIOVASCULAR: No chest pain, no palpitations  GASTROINTESTINAL: No pain. No nausea or vomiting; No diarrhea   NEUROLOGICAL: No headache or numbness, no tremors  MUSCULOSKELETAL: No joint pain, no muscle pain  GENITOURINARY: no dysuria, no frequency, no hesitancy  PSYCHIATRY: no depression , no anxiety  ALL OTHER  ROS negative        Vital Signs Last 24 Hrs  T(C): 36.6 (07 Jul 2020 14:28), Max: 36.7 (06 Jul 2020 21:00)  T(F): 97.8 (07 Jul 2020 14:28), Max: 98.1 (07 Jul 2020 00:00)  HR: 100 (07 Jul 2020 14:28) (71 - 107)  BP: 115/66 (07 Jul 2020 14:28) (115/66 - 135/73)  BP(mean): 88 (07 Jul 2020 04:43) (71 - 89)  RR: 18 (07 Jul 2020 14:28) (13 - 26)  SpO2: 100% (07 Jul 2020 14:28) (97% - 100%)    ________________________________________________  PHYSICAL EXAM:  GENERAL: NAD  HEENT: Normocephalic;  conjunctivae and sclerae clear; moist mucous membranes;   NECK : supple  CHEST/LUNG: Clear to auscultation bilaterally with good air entry   HEART: S1 S2  regular; no murmurs, gallops or rubs  ABDOMEN: Soft, Nontender, Nondistended; Bowel sounds present  EXTREMITIES: no cyanosis; no edema; no calf tenderness  SKIN: warm and dry; no rash  NERVOUS SYSTEM:  Awake and alert; Oriented  to place, person and time ; no new deficits    _________________________________________________  LABS:                        12.5   4.99  )-----------( 214      ( 07 Jul 2020 07:16 )             38.1     07-07    136  |  103  |  6<L>  ----------------------------<  276<H>  3.6   |  21<L>  |  0.74    Ca    8.4      07 Jul 2020 07:16  Phos  2.9     07-07  Mg     2.1     07-07    TPro  7.5  /  Alb  3.6  /  TBili  0.6  /  DBili  x   /  AST  44<H>  /  ALT  77<H>  /  AlkPhos  87  07-06        CAPILLARY BLOOD GLUCOSE      POCT Blood Glucose.: 280 mg/dL (07 Jul 2020 11:49)  POCT Blood Glucose.: 271 mg/dL (07 Jul 2020 07:37)  POCT Blood Glucose.: 229 mg/dL (06 Jul 2020 22:32)  POCT Blood Glucose.: 273 mg/dL (06 Jul 2020 19:30)  POCT Blood Glucose.: 214 mg/dL (06 Jul 2020 17:23)  POCT Blood Glucose.: 247 mg/dL (06 Jul 2020 16:23)  POCT Blood Glucose.: 227 mg/dL (06 Jul 2020 15:31)        RADIOLOGY & ADDITIONAL TESTS:    Imaging  Reviewed:  YES    Consultant(s) Notes Reviewed:   YES      Plan of care was discussed with patient and /or primary care giver; all questions and concerns were addressed 20 year old  overweight male, with PMH of hypothyroidism not on medication, presents with constipation and nausea. Pt was found to have blood sugar in the 300's, ketones in the urine with A1C of 11.5. BS improved to the 230's with closure of anion gap. Was given 5U of regular insulin in ED & was started on sliding scale q6, with finger stick q6. CT abdomen showed fatty liver and hepatosis. Pt admitted for new onset diabetes. Endocrine consulted. Pt was in ICU for blood glucose management. Pt found tachycardic in ICU, Cardiology consulted. Pt was transferred to  on 7/6 with improvement in blood sugars. Diabetic and nutrition teaching started. Pt still with blood sugars in the 200s, insulin regimen adjusted.     INTERVAL HPI/OVERNIGHT EVENTS: Seen at bedside, no new complaints    MEDICATIONS  (STANDING):  atorvastatin 40 milliGRAM(s) Oral at bedtime  chlorhexidine 2% Cloths 1 Application(s) Topical <User Schedule>  dextrose 50% Injectable 12.5 Gram(s) IV Push once  enoxaparin Injectable 40 milliGRAM(s) SubCutaneous daily  insulin glargine Injectable (LANTUS) 30 Unit(s) SubCutaneous at bedtime  insulin lispro (HumaLOG) corrective regimen sliding scale   SubCutaneous Before meals and at bedtime  insulin lispro Injectable (HumaLOG) 8 Unit(s) SubCutaneous three times a day before meals  levothyroxine 50 MICROGram(s) Oral daily  metFORMIN 500 milliGRAM(s) Oral two times a day  pantoprazole  Injectable 40 milliGRAM(s) IV Push daily  potassium phosphate / sodium phosphate Tablet (K-PHOS No. 2) 1 Tablet(s) Oral three times a day    MEDICATIONS  (PRN):  bisacodyl 5 milliGRAM(s) Oral every 12 hours PRN Constipation      __________________________________________________  REVIEW OF SYSTEMS:    CONSTITUTIONAL: No fever,   EYES: no acute visual disturbances  NECK: No pain or stiffness  RESPIRATORY: No cough; No shortness of breath  CARDIOVASCULAR: No chest pain, no palpitations  GASTROINTESTINAL: No pain. No nausea or vomiting; No diarrhea   NEUROLOGICAL: No headache or numbness, no tremors  MUSCULOSKELETAL: No joint pain, no muscle pain  GENITOURINARY: no dysuria, no frequency, no hesitancy  PSYCHIATRY: no depression , no anxiety  ALL OTHER  ROS negative        Vital Signs Last 24 Hrs  T(C): 36.6 (07 Jul 2020 14:28), Max: 36.7 (06 Jul 2020 21:00)  T(F): 97.8 (07 Jul 2020 14:28), Max: 98.1 (07 Jul 2020 00:00)  HR: 100 (07 Jul 2020 14:28) (71 - 107)  BP: 115/66 (07 Jul 2020 14:28) (115/66 - 135/73)  BP(mean): 88 (07 Jul 2020 04:43) (71 - 89)  RR: 18 (07 Jul 2020 14:28) (13 - 26)  SpO2: 100% (07 Jul 2020 14:28) (97% - 100%)    ________________________________________________  PHYSICAL EXAM:  GENERAL: NAD  HEENT: Normocephalic;  conjunctivae and sclerae clear; moist mucous membranes;   NECK : supple  CHEST/LUNG: Clear to auscultation bilaterally with good air entry   HEART: S1 S2  regular; no murmurs, gallops or rubs  ABDOMEN: Soft, Nontender, Nondistended; Bowel sounds present  EXTREMITIES: no cyanosis; no edema; no calf tenderness  SKIN: warm and dry; no rash  NERVOUS SYSTEM:  Awake and alert; Oriented  to place, person and time ; no new deficits    _________________________________________________  LABS:                        12.5   4.99  )-----------( 214      ( 07 Jul 2020 07:16 )             38.1     07-07    136  |  103  |  6<L>  ----------------------------<  276<H>  3.6   |  21<L>  |  0.74    Ca    8.4      07 Jul 2020 07:16  Phos  2.9     07-07  Mg     2.1     07-07    TPro  7.5  /  Alb  3.6  /  TBili  0.6  /  DBili  x   /  AST  44<H>  /  ALT  77<H>  /  AlkPhos  87  07-06        CAPILLARY BLOOD GLUCOSE      POCT Blood Glucose.: 280 mg/dL (07 Jul 2020 11:49)  POCT Blood Glucose.: 271 mg/dL (07 Jul 2020 07:37)  POCT Blood Glucose.: 229 mg/dL (06 Jul 2020 22:32)  POCT Blood Glucose.: 273 mg/dL (06 Jul 2020 19:30)  POCT Blood Glucose.: 214 mg/dL (06 Jul 2020 17:23)  POCT Blood Glucose.: 247 mg/dL (06 Jul 2020 16:23)  POCT Blood Glucose.: 227 mg/dL (06 Jul 2020 15:31)        RADIOLOGY & ADDITIONAL TESTS:    Imaging  Reviewed:  YES    Consultant(s) Notes Reviewed:   YES      Plan of care was discussed with patient and /or primary care giver; all questions and concerns were addressed

## 2020-07-07 NOTE — PROGRESS NOTE ADULT - PROBLEM SELECTOR PROBLEM 6
Patient had a positive shift.    Patient did not require seclusion/restraints or administration of emergency medications to manage behavior.    Ann Wright did participate in groups and was visible in the milieu.    Notable mental health symptoms during this shift: anxiety    Patient is working on these coping/social skills: interacting with peers, music    Visitors during this shift included parents.  Overall, the visit was positive.  Significant events during the visit included pt had a parent meeting with therapist and reported that it went well.    Other information about this shift: pt reports having a good day and enjoyed visit from parents.        06/30/19 1400   Behavioral Health   Hallucinations denies / not responding to hallucinations   Thinking intact   Orientation person: oriented;place: oriented   Memory baseline memory   Insight insight appropriate to situation   Judgement intact   Eye Contact at examiner   Affect full range affect   Mood mood is calm   Physical Appearance/Attire attire appropriate to age and situation;appears stated age   Hygiene well groomed   Suicidality other (see comments)  (denies)   1. Wish to be Dead No   2. Non-Specific Active Suicidal Thoughts  No   Self Injury other (see comment)  (denies)   Elopement   (no concerns)   Activity other (see comment)  (visible and social in milieu)   Speech clear;coherent   Medication Sensitivity no observed side effects;no stated side effects   Psychomotor / Gait balanced;steady   Activities of Daily Living   Hygiene/Grooming independent   Oral Hygiene independent   Dress independent   Room Organization independent          Discharge planning issues

## 2020-07-07 NOTE — DISCHARGE NOTE PROVIDER - CARE PROVIDER_API CALL
Jackeline Curtis  ENDOCRINOLOGY/METAB/DIABETES  [address] [place] [address]  Phone: [phone]  Fax: [phone]  Follow Up Time: Jackeline Curtis  ENDOCRINOLOGY/METAB/DIABETES  [address] [place] [address]  Phone: [phone]  Fax: [phone]  Follow Up Time: 1 week

## 2020-07-07 NOTE — PROGRESS NOTE ADULT - PROBLEM SELECTOR PLAN 4
History of hypothyroidism, non compliant with medication for years  Continue Synthroid 50 mcg  Endocrine Dr. Curtis

## 2020-07-07 NOTE — DIETITIAN INITIAL EVALUATION ADULT. - PROBLEM SELECTOR PLAN 1
On admission patient was found to have BS elevated to 300s, ketones in urine, AG of 20, VBG showed acidosis, low bicarb  He was given 3 L bolus in Ed after which blood sugars came down to 230s with closure of anion gap. DKA resolved  he was admitted for observation and evaluation of severity of diabetes  CT abdomen showed fatty liver with hepatosis  Patient has not been to PCP in 3 yrs  Likely had undiagnosed DM, and went into DKA due to vomiting   NPO for now  Will c/w Iv fluids  Was given 5U of regular insulin in ED  Started on sliding scale q6, with finger stick q6  Monitor BMp   Will start on diet in Am and adjust insulin as per sliding scale  Endo consult Dr Curtis  Monitor Bicarb

## 2020-07-07 NOTE — CHART NOTE - NSCHARTNOTEFT_GEN_A_CORE
20 year-old  overweight male, with medical history of hypothyroidism not on medication, presents with cc constipation x 4 days and vomiting for past 2 days, NBNB.  Patient was admitted to medicine initially but after enough fluid resuscitation acidosis did not resolve. Patient is being transferred to ICU for insulin drip and more close monitoring.   Patient is diagnosed with new onset diabetes. his A1C was noted to be 11.5. Patient was kept on insulin drip until AG closed and bicarb was > 18. Patient was bridged with lantus 20 units and started on 25 units daily with humalog 4u tid and HSS. He is tolerating diet appropriately, patient was seen by endocrinologist and his levothyroxine was adjusted to 50 mcg daily. Patient is stable for down grade to 410 a sign out given to NP 20 year-old  overweight male, with medical history of hypothyroidism not on medication, presents with cc constipation x 4 days and vomiting for past 2 days, NBNB.  Patient was admitted to medicine initially but after enough fluid resuscitation acidosis did not resolve. Patient is being transferred to ICU for insulin drip and more close monitoring.   Patient is diagnosed with new onset diabetes. his A1C was noted to be 11.5. Patient was kept on insulin drip until AG closed and bicarb was > 18. Patient was bridged with lantus 20 units and started on 25 units daily with humalog 4u tid and HSS. He is tolerating diet appropriately, patient was seen by endocrinologist and his levothyroxine was adjusted to 50 mcg daily. Patient is stable for down grade to 410 a sign out given to NP    things to follow:  Phophate persistently low - repleting with 15mm IV phos and starting oral potassium phos tablets TID for 2 days  Magnesium 1.8 will replace with IV mag fu am magnesium and phosphorous labs 20 year-old  overweight male, with medical history of hypothyroidism not on medication, presents with cc constipation x 4 days and vomiting for past 2 days, NBNB.  Patient was admitted to medicine initially but after enough fluid resuscitation acidosis did not resolve. Patient is being transferred to ICU for insulin drip and more close monitoring.   Patient is diagnosed with new onset diabetes. his A1C was noted to be 11.5. Patient was kept on insulin drip until AG closed and bicarb was > 18. Patient was bridged with lantus 20 units and started on 25 units daily with humalog 4u tid and HSS. He is tolerating diet appropriately, patient was seen by endocrinologist and his levothyroxine was adjusted to 50 mcg daily. Patient is stable for down grade to 410 a sign out given to KARYN Ellington attending Dr. Judge     things to follow:  Phophate persistently low - repleting with 15mm IV phos and starting oral potassium phos tablets TID for 2 days  Magnesium 1.8 will replace with IV mag fu am magnesium and phosphorous labs

## 2020-07-07 NOTE — DISCHARGE NOTE PROVIDER - NSFOLLOWUPCLINICS_GEN_ALL_ED_FT
[facility]  Internal Medicine  [address] [place] [address]  Phone: [phone]  Fax: [phone]  Follow Up Time: 1 week

## 2020-07-07 NOTE — PROGRESS NOTE ADULT - PROBLEM SELECTOR PROBLEM 1
Diabetic ketoacidosis without coma associated with type 2 diabetes mellitus Uncontrolled diabetes mellitus

## 2020-07-07 NOTE — PROGRESS NOTE ADULT - PROBLEM SELECTOR PLAN 1
Pt with new onset DM, DKA resolving   Hgb A1c 11.5   Lantus increased to 30 units HS and pre-meal Humalog to 8 units   Sliding scale  Continue diabetic teaching  Endo Dr. Curtis Pt with new onset DM  Pt was admitted to ICU due to DKA, now resolved  Hgb A1c 11.5   Lantus increased to 30 units HS and pre-meal Humalog to 8 units   Sliding scale  Continue diabetic teaching  Endo Dr. Curtis

## 2020-07-08 VITALS
SYSTOLIC BLOOD PRESSURE: 119 MMHG | DIASTOLIC BLOOD PRESSURE: 61 MMHG | RESPIRATION RATE: 17 BRPM | HEART RATE: 79 BPM | TEMPERATURE: 98 F | OXYGEN SATURATION: 100 %

## 2020-07-08 LAB
ANION GAP SERPL CALC-SCNC: 13 MMOL/L — SIGNIFICANT CHANGE UP (ref 5–17)
BUN SERPL-MCNC: 9 MG/DL — SIGNIFICANT CHANGE UP (ref 7–18)
CALCIUM SERPL-MCNC: 8.9 MG/DL — SIGNIFICANT CHANGE UP (ref 8.4–10.5)
CHLORIDE SERPL-SCNC: 103 MMOL/L — SIGNIFICANT CHANGE UP (ref 96–108)
CO2 SERPL-SCNC: 24 MMOL/L — SIGNIFICANT CHANGE UP (ref 22–31)
CREAT SERPL-MCNC: 0.69 MG/DL — SIGNIFICANT CHANGE UP (ref 0.5–1.3)
GLUCOSE BLDC GLUCOMTR-MCNC: 204 MG/DL — HIGH (ref 70–99)
GLUCOSE BLDC GLUCOMTR-MCNC: 234 MG/DL — HIGH (ref 70–99)
GLUCOSE SERPL-MCNC: 201 MG/DL — HIGH (ref 70–99)
HCT VFR BLD CALC: 38.6 % — LOW (ref 39–50)
HGB BLD-MCNC: 12.9 G/DL — LOW (ref 13–17)
MCHC RBC-ENTMCNC: 22.6 PG — LOW (ref 27–34)
MCHC RBC-ENTMCNC: 33.4 GM/DL — SIGNIFICANT CHANGE UP (ref 32–36)
MCV RBC AUTO: 67.5 FL — LOW (ref 80–100)
NRBC # BLD: 0 /100 WBCS — SIGNIFICANT CHANGE UP (ref 0–0)
PHOSPHATE SERPL-MCNC: 3.7 MG/DL — SIGNIFICANT CHANGE UP (ref 2.5–4.5)
PLATELET # BLD AUTO: 205 K/UL — SIGNIFICANT CHANGE UP (ref 150–400)
POTASSIUM SERPL-MCNC: 3.4 MMOL/L — LOW (ref 3.5–5.3)
POTASSIUM SERPL-SCNC: 3.4 MMOL/L — LOW (ref 3.5–5.3)
RBC # BLD: 5.72 M/UL — SIGNIFICANT CHANGE UP (ref 4.2–5.8)
RBC # FLD: 15.8 % — HIGH (ref 10.3–14.5)
SODIUM SERPL-SCNC: 140 MMOL/L — SIGNIFICANT CHANGE UP (ref 135–145)
WBC # BLD: 5.52 K/UL — SIGNIFICANT CHANGE UP (ref 3.8–10.5)
WBC # FLD AUTO: 5.52 K/UL — SIGNIFICANT CHANGE UP (ref 3.8–10.5)

## 2020-07-08 PROCEDURE — 99285 EMERGENCY DEPT VISIT HI MDM: CPT | Mod: 25

## 2020-07-08 PROCEDURE — 80053 COMPREHEN METABOLIC PANEL: CPT

## 2020-07-08 PROCEDURE — 84439 ASSAY OF FREE THYROXINE: CPT

## 2020-07-08 PROCEDURE — 80048 BASIC METABOLIC PNL TOTAL CA: CPT

## 2020-07-08 PROCEDURE — 85027 COMPLETE CBC AUTOMATED: CPT

## 2020-07-08 PROCEDURE — 74177 CT ABD & PELVIS W/CONTRAST: CPT

## 2020-07-08 PROCEDURE — 82962 GLUCOSE BLOOD TEST: CPT

## 2020-07-08 PROCEDURE — 83036 HEMOGLOBIN GLYCOSYLATED A1C: CPT

## 2020-07-08 PROCEDURE — 87640 STAPH A DNA AMP PROBE: CPT

## 2020-07-08 PROCEDURE — 86769 SARS-COV-2 COVID-19 ANTIBODY: CPT

## 2020-07-08 PROCEDURE — 87635 SARS-COV-2 COVID-19 AMP PRB: CPT

## 2020-07-08 PROCEDURE — 82803 BLOOD GASES ANY COMBINATION: CPT

## 2020-07-08 PROCEDURE — 87641 MR-STAPH DNA AMP PROBE: CPT

## 2020-07-08 PROCEDURE — U0003: CPT

## 2020-07-08 PROCEDURE — 80061 LIPID PANEL: CPT

## 2020-07-08 PROCEDURE — 84443 ASSAY THYROID STIM HORMONE: CPT

## 2020-07-08 PROCEDURE — 80307 DRUG TEST PRSMV CHEM ANLYZR: CPT

## 2020-07-08 PROCEDURE — 83605 ASSAY OF LACTIC ACID: CPT

## 2020-07-08 PROCEDURE — 82607 VITAMIN B-12: CPT

## 2020-07-08 PROCEDURE — 84481 FREE ASSAY (FT-3): CPT

## 2020-07-08 PROCEDURE — 81001 URINALYSIS AUTO W/SCOPE: CPT

## 2020-07-08 PROCEDURE — 83690 ASSAY OF LIPASE: CPT

## 2020-07-08 PROCEDURE — 82746 ASSAY OF FOLIC ACID SERUM: CPT

## 2020-07-08 PROCEDURE — 83735 ASSAY OF MAGNESIUM: CPT

## 2020-07-08 PROCEDURE — 83930 ASSAY OF BLOOD OSMOLALITY: CPT

## 2020-07-08 PROCEDURE — 84100 ASSAY OF PHOSPHORUS: CPT

## 2020-07-08 PROCEDURE — 36415 COLL VENOUS BLD VENIPUNCTURE: CPT

## 2020-07-08 RX ORDER — ISOPROPYL ALCOHOL, BENZOCAINE .7; .06 ML/ML; ML/ML
1 SWAB TOPICAL
Qty: 100 | Refills: 1
Start: 2020-07-08 | End: 2020-08-26

## 2020-07-08 RX ORDER — LEVOTHYROXINE SODIUM 125 MCG
1 TABLET ORAL
Qty: 30 | Refills: 0
Start: 2020-07-08 | End: 2020-08-06

## 2020-07-08 RX ORDER — INSULIN GLARGINE 100 [IU]/ML
0 INJECTION, SOLUTION SUBCUTANEOUS
Qty: 15 | Refills: 0
Start: 2020-07-08 | End: 2020-08-06

## 2020-07-08 RX ORDER — METFORMIN HYDROCHLORIDE 850 MG/1
1 TABLET ORAL
Qty: 60 | Refills: 0
Start: 2020-07-08 | End: 2020-08-06

## 2020-07-08 RX ORDER — INSULIN GLARGINE 100 [IU]/ML
30 INJECTION, SOLUTION SUBCUTANEOUS
Qty: 10 | Refills: 0
Start: 2020-07-08 | End: 2020-08-06

## 2020-07-08 RX ORDER — ATORVASTATIN CALCIUM 80 MG/1
1 TABLET, FILM COATED ORAL
Qty: 30 | Refills: 0
Start: 2020-07-08 | End: 2020-08-06

## 2020-07-08 RX ORDER — ENOXAPARIN SODIUM 100 MG/ML
30 INJECTION SUBCUTANEOUS
Qty: 10 | Refills: 0
Start: 2020-07-08 | End: 2020-08-06

## 2020-07-08 RX ORDER — POTASSIUM CHLORIDE 20 MEQ
40 PACKET (EA) ORAL ONCE
Refills: 0 | Status: COMPLETED | OUTPATIENT
Start: 2020-07-08 | End: 2020-07-08

## 2020-07-08 RX ADMIN — CHLORHEXIDINE GLUCONATE 1 APPLICATION(S): 213 SOLUTION TOPICAL at 05:06

## 2020-07-08 RX ADMIN — Medication 40 MILLIEQUIVALENT(S): at 11:00

## 2020-07-08 RX ADMIN — PANTOPRAZOLE SODIUM 40 MILLIGRAM(S): 20 TABLET, DELAYED RELEASE ORAL at 11:00

## 2020-07-08 RX ADMIN — ENOXAPARIN SODIUM 40 MILLIGRAM(S): 100 INJECTION SUBCUTANEOUS at 11:00

## 2020-07-08 NOTE — DISCHARGE NOTE NURSING/CASE MANAGEMENT/SOCIAL WORK - NSTRANSFERBELONGINGSRESP_GEN_A_NUR
no resume home diet Maintain back incision and dressing clean dry and intact. Call MD with any signs of infection ie fever, redness, drainage, or with signs of bleeding, unrelieved increased pain, or persistent nausea. Avoid twisting motion and remember to logroll to turn in bed. Continue to drink plenty of fluids. Avoid strenuous activity and constipation which may be a side effect from taking certain medications and narcotics. No heavy lifting greater than 10 pounds, ie. a gallon of milk. Safety and fall prevention measures reinforced. Follow-up with MD in office as instructed.

## 2020-07-08 NOTE — PROGRESS NOTE ADULT - REASON FOR ADMISSION
Constipation
Constipation
DKA
Constipation
Tachycardia DKA
Tachycardia
Constipation

## 2020-07-08 NOTE — PROGRESS NOTE ADULT - SUBJECTIVE AND OBJECTIVE BOX
Time of Visit:  Patient seen and examined.     MEDICATIONS  (STANDING):  atorvastatin 40 milliGRAM(s) Oral at bedtime  chlorhexidine 2% Cloths 1 Application(s) Topical <User Schedule>  dextrose 50% Injectable 12.5 Gram(s) IV Push once  enoxaparin Injectable 40 milliGRAM(s) SubCutaneous daily  insulin glargine Injectable (LANTUS) 30 Unit(s) SubCutaneous at bedtime  insulin lispro (HumaLOG) corrective regimen sliding scale   SubCutaneous Before meals and at bedtime  insulin lispro Injectable (HumaLOG) 8 Unit(s) SubCutaneous three times a day before meals  levothyroxine 50 MICROGram(s) Oral daily  metFORMIN 500 milliGRAM(s) Oral two times a day  pantoprazole  Injectable 40 milliGRAM(s) IV Push daily  potassium phosphate / sodium phosphate Tablet (K-PHOS No. 2) 1 Tablet(s) Oral three times a day      MEDICATIONS  (PRN):  bisacodyl 5 milliGRAM(s) Oral every 12 hours PRN Constipation       Medications up to date at time of exam.      PHYSICAL EXAMINATION:  Patient has no new complaints.  GENERAL: The patient is a well-developed, well-nourished, in no apparent distress.     Vital Signs Last 24 Hrs  T(C): 36.4 (08 Jul 2020 12:10), Max: 36.6 (07 Jul 2020 20:34)  T(F): 97.5 (08 Jul 2020 12:10), Max: 97.9 (07 Jul 2020 20:34)  HR: 79 (08 Jul 2020 12:10) (79 - 89)  BP: 119/61 (08 Jul 2020 12:10) (119/61 - 122/73)  BP(mean): --  RR: 17 (08 Jul 2020 12:10) (16 - 18)  SpO2: 100% (08 Jul 2020 12:10) (99% - 100%)   (if applicable)    Chest Tube (if applicable)    HEENT: Head is normocephalic and atraumatic. Extraocular muscles are intact. Mucous membranes are moist.     NECK: Supple, no palpable adenopathy.    LUNGS: Clear to auscultation, no wheezing, rales, or rhonchi.    HEART: Regular rate and rhythm without murmur.    ABDOMEN: Soft, nontender, and nondistended.  No hepatosplenomegaly is noted.    : No painful voiding, no pelvic pain    EXTREMITIES: Without any cyanosis, clubbing, rash, lesions or edema.    NEUROLOGIC: Awake, alert, oriented, grossly intact    SKIN: Warm, dry, good turgor.      LABS:                        12.9   5.52  )-----------( 205      ( 08 Jul 2020 06:20 )             38.6     07-08    140  |  103  |  9   ----------------------------<  201<H>  3.4<L>   |  24  |  0.69    Ca    8.9      08 Jul 2020 06:20  Phos  3.7     07-08  Mg     2.1     07-07                          MICROBIOLOGY: (if applicable)    RADIOLOGY & ADDITIONAL STUDIES:  EKG:   CXR:  ECHO:    IMPRESSION: 20y Male PAST MEDICAL & SURGICAL HISTORY:  Hypothyroid  No significant past surgical history   p/w             IMP: This is a 20 year-old  overweight male, with hypothyroidism, smoker  not on medication, presents with cc constipation x 4 days and vomiting for past 2 days, NBNB.  Patient was admitted to medicine initially but after enough fluid resuscitation acidosis did not resolve. Patient is being transferred to ICU for new onset DKA and life threatening metabolic acidosis. Clinically improved. tachycardia and SOB probable due to severe metabolic acidosis and hypovolemia     Diagnosis  -  DKA  -  New onset DM  -  Metabolic acidosis severe life threatening   -  Hypothyroid   -  Smoker  ( hooka pipe)  -  Dyslipidemia      Sugg;  -continue meds  -insulin and metfrmin  -advise to stop smoking / vaping  -wt loss/ diet exercise

## 2020-07-08 NOTE — CHART NOTE - NSCHARTNOTEFT_GEN_A_CORE
Patient discharged. Received a call from patient's mother, states Lantus pen is not covered by his insurance. [facility] called, Basaglar pen is covered. It was ordered and sent to pharmacy requested by mother.

## 2020-07-08 NOTE — PROGRESS NOTE ADULT - ATTENDING COMMENTS
Patient was seen and examined by me on 07/08/2020,interim events noted,labs and radiology studies reviewed.  Eddie Jeffery MD,FACC.  [address] [place] [address].  [id number].  [phone]
Patient was seen and examined by me on 07/07/2020,interim events noted,labs and radiology studies reviewed.  Eddie Jeffery MD,FACC.  [address] [place] [address].  [id number].  [phone]
IMP: This is a 20 year-old  overweight male, with hypothyroidism, smoker  not on medication, presents with cc constipation x 4 days and vomiting for past 2 days, NBNB.  Patient was admitted to medicine initially but after enough fluid resuscitation acidosis did not resolve. Patient is being transferred to ICU for new onset DKA and life threatening metabolic acidosis    Diagnosis  -  DKA  -  New onset DM  -  Metabolic acidosis severe life threatening   -  Hypothyroid   -  Smoker  ( hooka pipe)  -  Dyslipidemia      Plan;  -admit to icu  -continue insulin gtt until gap closes  -start D5W to avoid hypoglycemia  -monitor BMP q4h  -supp electrolyte  -FS q1h  -NPO  -endo eval pending   -statin  -advise to stop smoking   -advise wt loss , exercise and diet .

## 2020-07-08 NOTE — PROGRESS NOTE ADULT - SUBJECTIVE AND OBJECTIVE BOX
DATE OF SERVICE:Patient was seen and examined :07/08/2020    PRESENTING CC:DKA    SUBJ: Feels better no palpitations Accuchecks better ambulating      PMH -reviewed admission note, no change since admission  Heart failure: acute [ ] chronic [ ] acute or chronic [ ] diastolic [ ] systolic [ ] combined systolic and diastolic[ ]  ANETA: ATN[ ] renal medullary necrosis [ ] CKD I [ ]CKDII [ ]CKD III [ ]CKD IV [ ]CKD V [ ]Other pathological lesions [ ]      REVIEW OF SYSTEMS:  Constitutional: [ ] fever, [ ]weight loss,  [ ]fatigue  Eyes: [ ] visual changes  Respiratory: [ ]shortness of breath;  [ ] cough, [ ]wheezing, [ ]chills, [ ]hemoptysis  Cardiovascular: [ ] chest pain, [ ]palpitations, [ ]dizziness,  [ ]leg swelling[ ]orthopnea[ ]PND  Gastrointestinal: [ ] abdominal pain, [ ]nausea, [ ]vomiting,  [ ]diarrhea   Genitourinary: [ ] dysuria, [ ] hematuria  Neurologic: [ ] headaches [ ] tremors[ ]weakness  Skin: [ ] itching, [ ]burning, [ ] rashes  Endocrine: [ ] heat or cold intolerance  Musculoskeletal: [ ] joint pain or swelling; [ ] muscle, back, or extremity pain  Psychiatric: [ ] depression, [ ]anxiety, [ ]mood swings, or [ ]difficulty sleeping  Hematologic: [ ] easy bruising, [ ] bleeding gums    [x] All remaining systems negative except as per above.   [ ]Unable to obtain.    Vital Signs Last 24 Hrs  T(C): 36.4 (08 Jul 2020 12:10), Max: 36.4 (08 Jul 2020 12:10)  T(F): 97.5 (08 Jul 2020 12:10), Max: 97.5 (08 Jul 2020 12:10)  HR: 79 (08 Jul 2020 12:10) (79 - 79)  BP: 119/61 (08 Jul 2020 12:10) (119/61 - 119/61)  RR: 17 (08 Jul 2020 12:10) (17 - 17)  SpO2: 100% (08 Jul 2020 12:10) (100% - 100%)  I&O's Summary      PHYSICAL EXAM:  General: No acute distress BMI-33  HEENT: EOMI, PERRL  Neck: Supple, [ ] JVD  Lungs: Equal air entry bilaterally; [ ] rales [ ] wheezing [ ] rhonchi  Heart: Regular rate and rhythm; [x ] murmur   2/6 [x ] systolic [ ] diastolic [ ] radiation[ ] rubs [ ]  gallops  Abdomen: Nontender, bowel sounds present  Extremities: No clubbing, cyanosis, [ ] edema  Nervous system:  Alert & Oriented X3, no focal deficits  Psychiatric: Normal affect  Skin: No rashes or lesions    LABS:  07-08    140  |  103  |  9   ----------------------------<  201<H>  3.4<L>   |  24  |  0.69    Ca    8.9      08 Jul 2020 06:20  Phos  3.7     07-08      Creatinine Trend: 0.69<--, 0.74<--, 0.82<--, 0.83<--, 0.89<--, 0.89<--                        12.9   5.52  )-----------( 205      ( 08 Jul 2020 06:20 )             38.6       IMPRESSION AND PLAN:       20 year-old  overweight male, with hypothyroidism, smoker  not on medication, presents with cc constipation x 4 days and vomiting for past 2 days, NBNB.  Patient was admitted to medicine initially but after enough fluid resuscitation acidosis did not resolveClinically improved. tachycardia and SOB probable due to severe metabolic acidosis and hypovolemia     Problem/Plan - 1:  ·  Problem: Uncontrolled diabetes mellitus.  Plan: Pt with new onset DM  Pt was admitted to ICU due to DKA, now resolved  Hgb A1c 11.5   Lantus increased to 30 units HS and pre-meal Humalog to 8 units         Problem/Plan - 2:  ·  Problem: Hypophosphatemia.  Plan: Phosphorus 2.0  PO Potassium phosphate ordered    Problem/Plan - 3:  ·  Problem: Tachycardia Plan:Resolved  Likely 2/2 dehydration   No evidence for heart failure  Advised weight reduction Smoking cessation

## 2020-07-08 NOTE — DISCHARGE NOTE NURSING/CASE MANAGEMENT/SOCIAL WORK - PATIENT PORTAL LINK FT
You can access the FollowMyHealth Patient Portal offered by [facility] by registering at the following website: [email or web address]. By joining [employer]’s FollowMyHealth portal, you will also be able to view your health information using other applications (apps) compatible with our system.

## 2020-07-08 NOTE — PROGRESS NOTE ADULT - PROVIDER SPECIALTY LIST ADULT
Cardiology
Endocrinology
Internal Medicine
Pulmonology
Cardiology
Critical Care
Internal Medicine
Cardiology
Endocrinology
Internal Medicine

## 2020-07-08 NOTE — PROGRESS NOTE ADULT - SUBJECTIVE AND OBJECTIVE BOX
Interval Events:  pt in nad    Allergies    No Known Allergies    Intolerances      Endocrine/Metabolic Medications:  atorvastatin 40 milliGRAM(s) Oral at bedtime  dextrose 50% Injectable 12.5 Gram(s) IV Push once  insulin glargine Injectable (LANTUS) 30 Unit(s) SubCutaneous at bedtime  insulin lispro (HumaLOG) corrective regimen sliding scale   SubCutaneous Before meals and at bedtime  insulin lispro Injectable (HumaLOG) 8 Unit(s) SubCutaneous three times a day before meals  levothyroxine 50 MICROGram(s) Oral daily  metFORMIN 500 milliGRAM(s) Oral two times a day      Vital Signs Last 24 Hrs  T(C): 36.4 (08 Jul 2020 04:57), Max: 36.6 (07 Jul 2020 14:28)  T(F): 97.5 (08 Jul 2020 04:57), Max: 97.9 (07 Jul 2020 20:34)  HR: 89 (08 Jul 2020 04:57) (81 - 100)  BP: 122/73 (08 Jul 2020 04:57) (115/66 - 122/73)  BP(mean): --  RR: 18 (08 Jul 2020 04:57) (16 - 18)  SpO2: 99% (08 Jul 2020 04:57) (99% - 100%)      PHYSICAL EXAM  All physical exam findings normal, except those marked:  General:	Alert, active, cooperative, NAD, well hydrated  .		[] Abnormal:  Neck		Normal: supple, no cervical adenopathy, no palpable thyroid  .		[] Abnormal:  Cardiovascular	Normal: regular rate, normal S1, S2, no murmurs  .		[] Abnormal:  Respiratory	Normal: no chest wall deformity, normal respiratory pattern, CTA B/L  .		[] Abnormal:  Abdominal	Normal: soft, ND, NT, bowel sounds present, no masses, no organomegaly  .		[] Abnormal:  		Normal normal genitalia, testes descended, circumcised/uncircumcised  .		Loraine stage:			Breast loraine:  .		Menstrual history:  .		[] Abnormal:  Extremities	Normal: FROM x4  .		[] Abnormal:  Skin		Normal: intact and not indurated, no rash, no acanthosis nigricans  .		[] Abnormal:  Neurologic	Normal: grossly intact  .		[] Abnormal:    LABS                        12.9   5.52  )-----------( 205      ( 08 Jul 2020 06:20 )             38.6                               140    |  103    |  9                   Calcium: 8.9   / iCa: x      (07-08 @ 06:20)    ----------------------------<  201       Magnesium: x                                3.4     |  24     |  0.69             Phosphorous: 3.7        CAPILLARY BLOOD GLUCOSE      POCT Blood Glucose.: 204 mg/dL (08 Jul 2020 07:45)  POCT Blood Glucose.: 247 mg/dL (07 Jul 2020 21:22)  POCT Blood Glucose.: 242 mg/dL (07 Jul 2020 16:46)  POCT Blood Glucose.: 280 mg/dL (07 Jul 2020 11:49)        Assesment/plan     Problem/Recommendation - 1:   Diabetic ketoacidosis without coma associated with type 2 diabetes mellitus. -DKA due to un controlled new onset DM  no nausea/ vomiting  tolerating diet  cont lantus 30 units qhs  humalog to 8 units ac tid  cont metformin 500 bid  dm teaching  nutrition consult.  fine tuning as out pt     Problem/Recommendation - 2:   Hypothyroid. : agree with lt4   change to 50 mcg qd  compliance d/w pt.

## 2022-08-03 NOTE — DISCHARGE NOTE PROVIDER - NSDCCPCAREPLAN_GEN_ALL_CORE_FT
PRINCIPAL DISCHARGE DIAGNOSIS  Diagnosis: Diabetic ketoacidosis without coma associated with type 2 diabetes mellitus  Assessment and Plan of Treatment: HgA1C this admission 11.5  Make sure you get your HgA1c checked every three months.  If you take oral diabetes medications, check your blood glucose two times a day.  If you take insulin, check your blood glucose before meals and at bedtime.  It's important not to skip any meals.  Keep a log of your blood glucose results and always take it with you to your doctor appointments.  Keep a list of your current medications including injectables and over the counter medications and bring this medication list with you to all your doctor appointments.  If you have not seen your ophthalmologist this year call for appointment.  Check your feet daily for redness, sores, or openings. Do not self treat. If no improvement in two days call your primary care physician for an appointment.  Low blood sugar (hypoglycemia) is a blood sugar below 70mg/dl. Check your blood sugar if you feel signs/symptoms of hypoglycemia. If your blood sugar is below 70 take 15 grams of carbohydrates (ex 4 oz of apple juice, 3-4 glucose tablets, or 4-6 oz of regular soda) wait 15 minutes and repeat blood sugar to make sure it comes up above 70.  If your blood sugar is above 70 and you are due for a meal, have a meal.  If you are not due for a meal have a snack.  This snack helps keeps your blood sugar at a safe range.  Follow up with the endocrinologist as outpatient.         SECONDARY DISCHARGE DIAGNOSES  Diagnosis: Constipation  Assessment and Plan of Treatment: Continue with medications as prescribed.  Follow-up with your primary care physician.  Call your physician if you develop nausea/vomiting, abdominal pain not relieved with pain regimen, constipation not relieved with bowel  regimen.    Diagnosis: Hypothyroid  Assessment and Plan of Treatment: You have history of hypothyroidism. You were seen by an endocrinologist and werestarted on Synthroid 50 mcg. Please continue to take your medication as prescribed. Follow up with the endocrinologist outpatient.    Diagnosis: Hypophosphatemia  Assessment and Plan of Treatment: Your phosphorus level were low. You were given supplements and now your level is normal. PRINCIPAL DISCHARGE DIAGNOSIS  Diagnosis: Diabetic ketoacidosis without coma associated with type 2 diabetes mellitus  Assessment and Plan of Treatment: HgA1C this admission 11.5  Make sure you get your HgA1c checked every three months.  If you take oral diabetes medications, check your blood glucose two times a day.  If you take insulin, check your blood glucose before meals and at bedtime.  It's important not to skip any meals.  Keep a log of your blood glucose results and always take it with you to your doctor appointments.  Keep a list of your current medications including injectables and over the counter medications and bring this medication list with you to all your doctor appointments.  If you have not seen your ophthalmologist this year call for appointment.  Check your feet daily for redness, sores, or openings. Do not self treat. If no improvement in two days call your primary care physician for an appointment.  Low blood sugar (hypoglycemia) is a blood sugar below 70mg/dl. Check your blood sugar if you feel signs/symptoms of hypoglycemia. If your blood sugar is below 70 take 15 grams of carbohydrates (ex 4 oz of apple juice, 3-4 glucose tablets, or 4-6 oz of regular soda) wait 15 minutes and repeat blood sugar to make sure it comes up above 70.  If your blood sugar is above 70 and you are due for a meal, have a meal.  If you are not due for a meal have a snack.  This snack helps keeps your blood sugar at a safe range.  Follow up with Dr. Curtis as outpatient in ONE WEEK.         SECONDARY DISCHARGE DIAGNOSES  Diagnosis: Constipation  Assessment and Plan of Treatment: Continue with medications as prescribed.  Follow-up with your primary care physician.  Call your physician if you develop nausea/vomiting, abdominal pain not relieved with pain regimen, constipation not relieved with bowel  regimen.    Diagnosis: Hypothyroid  Assessment and Plan of Treatment: You have history of hypothyroidism. You were seen by an endocrinologist and werestarted on Synthroid 50 mcg. Please continue to take your medication as prescribed. Follow up with the endocrinologist outpatient.    Diagnosis: Hypophosphatemia  Assessment and Plan of Treatment: Your phosphorus level were low. You were given supplements and now your level is normal. [facility]  Cardiology  [address] [place] [address]  Phone: [phone]  Fax:   Follow Up Time: 1-3 Days

## 2024-12-02 NOTE — H&P ADULT - BIRTH SEX
Care Transitions Note    Follow Up Call     Attempted to reach patient for transitions of care follow up.  Unable to reach patient.      Outreach Attempts:   HIPAA compliant voicemail left for patient.     Care Summary Note: Second and final attempt at follow up outreach call, no answer.  CTN left VM with contact information and request for return call.  CTN will close program & remain available.        Follow Up Appointment:   Future Appointments         Provider Specialty Dept Phone    12/13/2024 1:30 PM (Arrive by 12:45 PM) [facility] MRI  2 Radiology [phone]    2/5/2025 12:40 PM Lindsay Stone MD Endocrinology [phone]            No further follow-up call indicated     LAM WNOG RN      
Male